# Patient Record
Sex: MALE | Employment: OTHER | URBAN - METROPOLITAN AREA
[De-identification: names, ages, dates, MRNs, and addresses within clinical notes are randomized per-mention and may not be internally consistent; named-entity substitution may affect disease eponyms.]

---

## 2023-04-21 ENCOUNTER — HOSPITAL ENCOUNTER (OUTPATIENT)
Facility: HOSPITAL | Age: 78
End: 2023-04-21
Attending: SURGERY | Admitting: SURGERY
Payer: MEDICARE

## 2024-03-13 RX ORDER — SIMVASTATIN 20 MG
20 TABLET ORAL AT BEDTIME
COMMUNITY
End: 2024-05-28

## 2024-03-13 RX ORDER — SERTRALINE HYDROCHLORIDE 25 MG/1
25 TABLET, FILM COATED ORAL DAILY
COMMUNITY
End: 2024-05-28

## 2024-03-13 RX ORDER — MEMANTINE HYDROCHLORIDE 5 MG/1
5 TABLET ORAL DAILY
COMMUNITY
End: 2024-05-28

## 2024-03-13 RX ORDER — CHLORAL HYDRATE 500 MG
2 CAPSULE ORAL DAILY
COMMUNITY
End: 2024-05-28

## 2024-03-13 RX ORDER — METOPROLOL TARTRATE 25 MG/1
12.5 TABLET, FILM COATED ORAL 2 TIMES DAILY
COMMUNITY
End: 2024-05-28

## 2024-03-13 RX ORDER — TAMSULOSIN HYDROCHLORIDE 0.4 MG/1
0.4 CAPSULE ORAL AT BEDTIME
COMMUNITY
End: 2024-05-28

## 2024-03-13 RX ORDER — UREA 10 %
500 LOTION (ML) TOPICAL DAILY
COMMUNITY
End: 2024-05-28

## 2024-03-14 ENCOUNTER — HOSPITAL ENCOUNTER (OUTPATIENT)
Facility: CLINIC | Age: 79
Discharge: HOME | End: 2024-03-14
Attending: OPHTHALMOLOGY | Admitting: OPHTHALMOLOGY
Payer: COMMERCIAL

## 2024-03-14 ENCOUNTER — ANESTHESIA (OUTPATIENT)
Dept: SURGERY | Facility: CLINIC | Age: 79
End: 2024-03-14
Payer: COMMERCIAL

## 2024-03-14 ENCOUNTER — ANESTHESIA EVENT (OUTPATIENT)
Dept: SURGERY | Facility: CLINIC | Age: 79
End: 2024-03-14
Payer: COMMERCIAL

## 2024-03-14 VITALS
DIASTOLIC BLOOD PRESSURE: 99 MMHG | WEIGHT: 183.6 LBS | BODY MASS INDEX: 24.33 KG/M2 | TEMPERATURE: 97 F | OXYGEN SATURATION: 97 % | HEART RATE: 115 BPM | SYSTOLIC BLOOD PRESSURE: 140 MMHG | RESPIRATION RATE: 16 BRPM | HEIGHT: 73 IN

## 2024-03-14 DIAGNOSIS — Z98.890 POSTOPERATIVE EYE STATE: Primary | ICD-10-CM

## 2024-03-14 PROBLEM — R97.20 ELEVATED PSA: Status: ACTIVE | Noted: 2024-03-14

## 2024-03-14 PROBLEM — D69.6 DISORDER INVOLVING THROMBOCYTOPENIA (H): Status: ACTIVE | Noted: 2024-03-14

## 2024-03-14 PROBLEM — G30.9 ALZHEIMER'S DISEASE (H): Status: ACTIVE | Noted: 2023-06-15

## 2024-03-14 PROBLEM — M72.0 DUPUYTREN CONTRACTURE: Status: ACTIVE | Noted: 2018-07-24

## 2024-03-14 PROBLEM — R35.0 INCREASED FREQUENCY OF URINATION: Status: ACTIVE | Noted: 2020-08-13

## 2024-03-14 PROBLEM — I48.20 CHRONIC ATRIAL FIBRILLATION (H): Status: ACTIVE | Noted: 2023-06-30

## 2024-03-14 PROBLEM — Z86.0100 HISTORY OF COLONIC POLYPS: Status: ACTIVE | Noted: 2018-07-24

## 2024-03-14 PROBLEM — E55.9 VITAMIN D DEFICIENCY: Status: ACTIVE | Noted: 2024-03-14

## 2024-03-14 PROBLEM — F02.80 ALZHEIMER'S DISEASE (H): Status: ACTIVE | Noted: 2023-06-15

## 2024-03-14 PROCEDURE — 710N000012 HC RECOVERY PHASE 2, PER MINUTE: Performed by: OPHTHALMOLOGY

## 2024-03-14 PROCEDURE — 250N000009 HC RX 250: Performed by: NURSE ANESTHETIST, CERTIFIED REGISTERED

## 2024-03-14 PROCEDURE — 370N000017 HC ANESTHESIA TECHNICAL FEE, PER MIN: Performed by: OPHTHALMOLOGY

## 2024-03-14 PROCEDURE — 88331 PATH CONSLTJ SURG 1 BLK 1SPC: CPT | Mod: TC | Performed by: OPHTHALMOLOGY

## 2024-03-14 PROCEDURE — 250N000011 HC RX IP 250 OP 636: Performed by: ANESTHESIOLOGY

## 2024-03-14 PROCEDURE — 258N000003 HC RX IP 258 OP 636: Performed by: NURSE ANESTHETIST, CERTIFIED REGISTERED

## 2024-03-14 PROCEDURE — 67840 REMOVE EYELID LESION: CPT | Performed by: ANESTHESIOLOGY

## 2024-03-14 PROCEDURE — 999N000141 HC STATISTIC PRE-PROCEDURE NURSING ASSESSMENT: Performed by: OPHTHALMOLOGY

## 2024-03-14 PROCEDURE — 710N000009 HC RECOVERY PHASE 1, LEVEL 1, PER MIN: Performed by: OPHTHALMOLOGY

## 2024-03-14 PROCEDURE — 250N000011 HC RX IP 250 OP 636: Performed by: NURSE ANESTHETIST, CERTIFIED REGISTERED

## 2024-03-14 PROCEDURE — 360N000076 HC SURGERY LEVEL 3, PER MIN: Performed by: OPHTHALMOLOGY

## 2024-03-14 PROCEDURE — 250N000009 HC RX 250: Performed by: OPHTHALMOLOGY

## 2024-03-14 PROCEDURE — 67840 REMOVE EYELID LESION: CPT | Performed by: NURSE ANESTHETIST, CERTIFIED REGISTERED

## 2024-03-14 PROCEDURE — 99100 ANES PT EXTEME AGE<1 YR&>70: CPT | Performed by: NURSE ANESTHETIST, CERTIFIED REGISTERED

## 2024-03-14 PROCEDURE — 272N000001 HC OR GENERAL SUPPLY STERILE: Performed by: OPHTHALMOLOGY

## 2024-03-14 PROCEDURE — 250N000013 HC RX MED GY IP 250 OP 250 PS 637: Performed by: OPHTHALMOLOGY

## 2024-03-14 RX ORDER — ERYTHROMYCIN 5 MG/G
OINTMENT OPHTHALMIC
Status: DISCONTINUED
Start: 2024-03-14 | End: 2024-03-14 | Stop reason: HOSPADM

## 2024-03-14 RX ORDER — SODIUM CHLORIDE, SODIUM LACTATE, POTASSIUM CHLORIDE, CALCIUM CHLORIDE 600; 310; 30; 20 MG/100ML; MG/100ML; MG/100ML; MG/100ML
INJECTION, SOLUTION INTRAVENOUS CONTINUOUS PRN
Status: DISCONTINUED | OUTPATIENT
Start: 2024-03-14 | End: 2024-03-14

## 2024-03-14 RX ORDER — ONDANSETRON 2 MG/ML
INJECTION INTRAMUSCULAR; INTRAVENOUS PRN
Status: DISCONTINUED | OUTPATIENT
Start: 2024-03-14 | End: 2024-03-14

## 2024-03-14 RX ORDER — OXYCODONE HYDROCHLORIDE 5 MG/1
5 TABLET ORAL EVERY 6 HOURS PRN
Qty: 12 TABLET | Refills: 0 | Status: SHIPPED | OUTPATIENT
Start: 2024-03-14 | End: 2024-03-17

## 2024-03-14 RX ORDER — FENTANYL CITRATE 0.05 MG/ML
50 INJECTION, SOLUTION INTRAMUSCULAR; INTRAVENOUS EVERY 5 MIN PRN
Status: DISCONTINUED | OUTPATIENT
Start: 2024-03-14 | End: 2024-03-14 | Stop reason: HOSPADM

## 2024-03-14 RX ORDER — ONDANSETRON 2 MG/ML
4 INJECTION INTRAMUSCULAR; INTRAVENOUS EVERY 30 MIN PRN
Status: DISCONTINUED | OUTPATIENT
Start: 2024-03-14 | End: 2024-03-14 | Stop reason: HOSPADM

## 2024-03-14 RX ORDER — PROPOFOL 10 MG/ML
INJECTION, EMULSION INTRAVENOUS PRN
Status: DISCONTINUED | OUTPATIENT
Start: 2024-03-14 | End: 2024-03-14

## 2024-03-14 RX ORDER — HYDRALAZINE HYDROCHLORIDE 20 MG/ML
2.5-5 INJECTION INTRAMUSCULAR; INTRAVENOUS EVERY 10 MIN PRN
Status: DISCONTINUED | OUTPATIENT
Start: 2024-03-14 | End: 2024-03-14 | Stop reason: HOSPADM

## 2024-03-14 RX ORDER — ERYTHROMYCIN 5 MG/G
OINTMENT OPHTHALMIC PRN
Status: DISCONTINUED | OUTPATIENT
Start: 2024-03-14 | End: 2024-03-14 | Stop reason: HOSPADM

## 2024-03-14 RX ORDER — OXYCODONE HYDROCHLORIDE 5 MG/1
5 TABLET ORAL ONCE
Status: COMPLETED | OUTPATIENT
Start: 2024-03-14 | End: 2024-03-14

## 2024-03-14 RX ORDER — NALOXONE HYDROCHLORIDE 0.4 MG/ML
0.1 INJECTION, SOLUTION INTRAMUSCULAR; INTRAVENOUS; SUBCUTANEOUS
Status: DISCONTINUED | OUTPATIENT
Start: 2024-03-14 | End: 2024-03-14 | Stop reason: HOSPADM

## 2024-03-14 RX ORDER — LIDOCAINE HYDROCHLORIDE AND EPINEPHRINE 10; 10 MG/ML; UG/ML
INJECTION, SOLUTION INFILTRATION; PERINEURAL
Status: DISCONTINUED
Start: 2024-03-14 | End: 2024-03-14 | Stop reason: HOSPADM

## 2024-03-14 RX ORDER — FENTANYL CITRATE 50 UG/ML
INJECTION, SOLUTION INTRAMUSCULAR; INTRAVENOUS PRN
Status: DISCONTINUED | OUTPATIENT
Start: 2024-03-14 | End: 2024-03-14

## 2024-03-14 RX ORDER — LIDOCAINE HYDROCHLORIDE AND EPINEPHRINE 10; 10 MG/ML; UG/ML
INJECTION, SOLUTION INFILTRATION; PERINEURAL PRN
Status: DISCONTINUED | OUTPATIENT
Start: 2024-03-14 | End: 2024-03-14 | Stop reason: HOSPADM

## 2024-03-14 RX ORDER — ONDANSETRON 4 MG/1
4 TABLET, ORALLY DISINTEGRATING ORAL EVERY 30 MIN PRN
Status: DISCONTINUED | OUTPATIENT
Start: 2024-03-14 | End: 2024-03-14 | Stop reason: HOSPADM

## 2024-03-14 RX ORDER — LIDOCAINE HYDROCHLORIDE 20 MG/ML
INJECTION, SOLUTION INFILTRATION; PERINEURAL PRN
Status: DISCONTINUED | OUTPATIENT
Start: 2024-03-14 | End: 2024-03-14

## 2024-03-14 RX ORDER — ERYTHROMYCIN 5 MG/G
OINTMENT OPHTHALMIC
Qty: 3.5 G | Refills: 1 | Status: SHIPPED | OUTPATIENT
Start: 2024-03-14 | End: 2024-05-28

## 2024-03-14 RX ORDER — FENTANYL CITRATE 0.05 MG/ML
25 INJECTION, SOLUTION INTRAMUSCULAR; INTRAVENOUS EVERY 5 MIN PRN
Status: DISCONTINUED | OUTPATIENT
Start: 2024-03-14 | End: 2024-03-14 | Stop reason: HOSPADM

## 2024-03-14 RX ORDER — DEXAMETHASONE SODIUM PHOSPHATE 4 MG/ML
INJECTION, SOLUTION INTRA-ARTICULAR; INTRALESIONAL; INTRAMUSCULAR; INTRAVENOUS; SOFT TISSUE PRN
Status: DISCONTINUED | OUTPATIENT
Start: 2024-03-14 | End: 2024-03-14

## 2024-03-14 RX ORDER — NEOMYCIN POLYMYXIN B SULFATES AND DEXAMETHASONE 3.5; 10000; 1 MG/ML; [USP'U]/ML; MG/ML
SUSPENSION/ DROPS OPHTHALMIC
Qty: 5 ML | Refills: 0 | Status: SHIPPED | OUTPATIENT
Start: 2024-03-14 | End: 2024-05-28

## 2024-03-14 RX ORDER — SODIUM CHLORIDE, SODIUM LACTATE, POTASSIUM CHLORIDE, CALCIUM CHLORIDE 600; 310; 30; 20 MG/100ML; MG/100ML; MG/100ML; MG/100ML
INJECTION, SOLUTION INTRAVENOUS CONTINUOUS
Status: DISCONTINUED | OUTPATIENT
Start: 2024-03-14 | End: 2024-03-14 | Stop reason: HOSPADM

## 2024-03-14 RX ORDER — TETRACAINE HYDROCHLORIDE 5 MG/ML
SOLUTION OPHTHALMIC PRN
Status: DISCONTINUED | OUTPATIENT
Start: 2024-03-14 | End: 2024-03-14 | Stop reason: HOSPADM

## 2024-03-14 RX ADMIN — SODIUM CHLORIDE, POTASSIUM CHLORIDE, SODIUM LACTATE AND CALCIUM CHLORIDE: 600; 310; 30; 20 INJECTION, SOLUTION INTRAVENOUS at 11:39

## 2024-03-14 RX ADMIN — FENTANYL CITRATE 50 MCG: 50 INJECTION, SOLUTION INTRAMUSCULAR; INTRAVENOUS at 13:03

## 2024-03-14 RX ADMIN — DEXAMETHASONE SODIUM PHOSPHATE 4 MG: 4 INJECTION, SOLUTION INTRA-ARTICULAR; INTRALESIONAL; INTRAMUSCULAR; INTRAVENOUS; SOFT TISSUE at 11:45

## 2024-03-14 RX ADMIN — FENTANYL CITRATE 50 MCG: 50 INJECTION, SOLUTION INTRAMUSCULAR; INTRAVENOUS at 12:51

## 2024-03-14 RX ADMIN — FENTANYL CITRATE 100 MCG: 50 INJECTION INTRAMUSCULAR; INTRAVENOUS at 11:39

## 2024-03-14 RX ADMIN — OXYCODONE HYDROCHLORIDE 5 MG: 5 TABLET ORAL at 13:17

## 2024-03-14 RX ADMIN — ONDANSETRON 4 MG: 2 INJECTION INTRAMUSCULAR; INTRAVENOUS at 11:45

## 2024-03-14 RX ADMIN — LIDOCAINE HYDROCHLORIDE 40 MG: 20 INJECTION, SOLUTION INFILTRATION; PERINEURAL at 11:43

## 2024-03-14 RX ADMIN — PROPOFOL 40 MG: 10 INJECTION, EMULSION INTRAVENOUS at 11:43

## 2024-03-14 ASSESSMENT — ACTIVITIES OF DAILY LIVING (ADL)
ADLS_ACUITY_SCORE: 35

## 2024-03-14 ASSESSMENT — ENCOUNTER SYMPTOMS: DYSRHYTHMIAS: 1

## 2024-03-14 NOTE — ANESTHESIA POSTPROCEDURE EVALUATION
Patient: Young Tran    Procedure: Procedure(s):  Left Lower Lid Wedge Excision of Lesion with Frozen Section Control and Reconstruction       Anesthesia Type:  MAC    Note:     Postop Pain Control: Uneventful            Sign Out: Well controlled pain   PONV: No   Neuro/Psych: Uneventful            Sign Out: Acceptable/Baseline neuro status   Airway/Respiratory: Uneventful            Sign Out: Acceptable/Baseline resp. status   CV/Hemodynamics: Uneventful            Sign Out: Acceptable CV status; No obvious hypovolemia; No obvious fluid overload   Other NRE: NONE   DID A NON-ROUTINE EVENT OCCUR? No           Last vitals:  Vitals Value Taken Time   /107 03/14/24 1314   Temp     Pulse 117 03/14/24 1315   Resp 11 03/14/24 1315   SpO2 96 % 03/14/24 1315   Vitals shown include unfiled device data.    Electronically Signed By: Crystal Hansen MD  March 14, 2024  1:27 PM

## 2024-03-14 NOTE — BRIEF OP NOTE
[facility]    Brief Operative Note    Pre-operative diagnosis: Basal cell carcinoma of lower eyelid, left [C44.1192]  Post-operative diagnosis Same as pre-operative diagnosis    Procedure: Left Lower Lid Wedge Excision of Lesion with Frozen Section Control and Reconstruction, Bilateral - Eye    Surgeon: Surgeon(s) and Role:     * Suhas Flores MD - Primary  Anesthesia: MAC   Estimated Blood Loss: Minimal    Drains: None  Specimens:   ID Type Source Tests Collected by Time Destination   1 : LEFT LOWER EYELID LESION Tissue Eyelid, Lower, Left SURGICAL PATHOLOGY EXAM Suhas Flores MD 3/14/2024 11:50 AM    2 : LEFT LOWER EYELID LESION MEDIAL MARGIN Tissue Eyelid, Lower, Left SURGICAL PATHOLOGY EXAM Suhas Flores MD 3/14/2024 11:52 AM    3 : LEFT LOWER EYELID LESION LATERAL MARGIN Tissue Eyelid, Lower, Left SURGICAL PATHOLOGY EXAM Suhas Flores MD 3/14/2024 11:52 AM    4 : LEFT LOWER EYELID LESION DEEP MARGIN Tissue Eyelid, Lower, Left SURGICAL PATHOLOGY EXAM Suhas Flores MD 3/14/2024 11:54 AM      Findings:   None.  Complications: None.  Implants: * No implants in log *

## 2024-03-14 NOTE — DISCHARGE INSTRUCTIONS
Post-operative Instructions  Ophthalmic Plastic and Reconstructive Surgery  Suhas Flores M.D.      All instructions apply to the operated eye(s) or eyelid(s).    Wound care and personal care  ? Apply ice compresses for 20 minutes every hour while awake for 2 days, then switch to warm water compresses 4 times a day until seen by your physician.For warm packs you can place a cup of dry uncooked rice in a clean cotton sock. Then place sock in microwave 30 seconds to one minute. Next place the warm sock into a plastic bag and wrap the bag with clean warm wet washcloth and place over operated eye.    ? You may shower or wash your hair the day after surgery. Do not bathe or go swimming for 1 week to prevent contamination of your wounds.  ? Do not apply make-up to the eyes or eyelids for 2 weeks after surgery.  ? Expect some swelling, bruising, black eye (even into the lower eyelids and cheeks). Also expect serum caking, crusting and discharge from the eye and/or incisions. A small amount of surface bleeding is normal for the first 48 hours.  ? Your eye(s) and eyelid(s) may be painful and tender. This is normal after surgery. Use the pain medication as prescribed. If your pain does not improve despite the medication, contact the office.      Contact information and follow-up  ? Return to the Eye Clinic for a follow-up appointment with your physician as scheduled. If no appointment has been scheduled, call [phone] for an appointment with Dr. Flores within 1 to 2 weeks from your date of surgery.      Activity restrictions and driving  ? Avoid heavy lifting, bending, exercise or strenuous activity for 1 week after surgery. You may resume other activities and return to work as tolerated.  ? You may not resume driving until have you stopped using narcotic pain medication (such as Norco, Percocet, Tylenol #3).    Medications  ? Restart all your regular home medications and eye drops. If you take Plavix or Aspirin  on a regular basis, wait for 5 days after your surgery before restarting these in order to decrease the risk of bleeding complications.  ? Avoid aspirin and aspirin-like medications (Motrin, Aleve, Ibuprofen, Kelsea-Kennedy etc) for 5 days to reduce the risk of bleeding. You may take Tylenol (acetaminophen) for pain.  ? In addition to your home medications, take the following post-operative medications as prescribed by your physician.     ? Apply antibiotic ointment to all sutures three a day, and into the operated eye(s) at night.   ? Instill eye drops four times a day until the bottle is finished.   ? Take 1 to 2 pain pills as needed for pain.     ? WARNING: All the prescription pain medications listed above contain Tylenol (acetaminophen). You must not take more than 4,000 mg of acetaminophen per 24-hour period. This is equivalent to 12 tablets of Norco, Percocet or Tylenol #3. If you take other over-the-counter medications containing acetaminophen, you must take the amount of acetaminophen into account and reduce the number of prescribed pain pills accordingly.  ? The prescribed medications may make you drowsy. You must not drive a car, operate heavy machinery or drink alcohol while taking them.  ? The prescribed pain medications may cause constipation and nausea. Take them with some food to prevent a stomach upset. If you continue to experience nausea, call your physician.    For severe pain, bleeding, or loss of vision, call the office at [phone].  Same Day Surgery Discharge Instructions for  Sedation and General Anesthesia     It's not unusual to feel dizzy, light-headed or faint for up to 24 hours after surgery or while taking pain medication.  If you have these symptoms: sit for a few minutes before standing and have someone assist you when you get up to walk or use the bathroom.    You should rest and relax for the next 24 hours. We recommend you make arrangements to have an adult stay with you for at  least 24 hours after your discharge.  Avoid hazardous and strenuous activity.    DO NOT DRIVE any vehicle or operate mechanical equipment for 24 hours following the end of your surgery.  Even though you may feel normal, your reactions may be affected by the medication you have received.    Do not drink alcoholic beverages for 24 hours following surgery.     Slowly progress to your regular diet as you feel able. It's not unusual to feel nauseated and/or vomit after receiving anesthesia.  If you develop these symptoms, drink clear liquids (apple juice, ginger ale, broth, 7-up, etc. ) until you feel better.  If your nausea and vomiting persists for 24 hours, please notify your surgeon.      All narcotic pain medications, along with inactivity and anesthesia, can cause constipation. Drinking plenty of liquids and increasing fiber intake will help.    For any questions of a medical nature, call your surgeon.    Do not make important decisions for 24 hours.    If you had general anesthesia, you may have a sore throat for a couple of days related to the breathing tube used during surgery.  You may use Cepacol lozenges to help with this discomfort.  If it worsens or if you develop a fever, contact your surgeon.     If you feel your pain is not well managed with the pain medications prescribed by your surgeon, please contact your surgeon's office to let them know so they can address your concerns.            **If you have questions or concerns about your procedure,   call Dr Flores at [phone](if you see him in [place]) or  [phone] (if you see him at the [facility])**

## 2024-03-14 NOTE — ANESTHESIA PREPROCEDURE EVALUATION
Anesthesia Pre-Procedure Evaluation    Patient: Young Tran   MRN: [id number] : 1945        Procedure : Procedure(s):  Left Lower Lid Wedge Excision of Lesion with Frozen Section Control          Past Medical History:   Diagnosis Date    Alzheimer's disease (H)     Chronic a-fib (H)     Dupuytren contracture     Elevated prostate specific antigen (PSA)     History of colonic polyps     HLD (hyperlipidemia)     Prostate cancer (H)     Tear of lateral meniscus of left knee     Vitamin B12 deficiency (non anemic)     Vitamin D deficiency       Past Surgical History:   Procedure Laterality Date    BIOPSY      prostate x4    COLONOSCOPY  2012    COLONOSCOPY  2009    COLONOSCOPY  2007    HERNIA REPAIR Right 1998    POLYPECTOMY        No Known Allergies   Social History     Tobacco Use    Smoking status: Never    Smokeless tobacco: Never   Substance Use Topics    Alcohol use: Yes     Comment: 1-2 drinks per week      Wt Readings from Last 1 Encounters:   24 83.3 kg (183 lb 9.6 oz)        Anesthesia Evaluation   Pt has had prior anesthetic.         ROS/MED HX  ENT/Pulmonary:    (-) sleep apnea   Neurologic: Comment: Cognitive decline, memory loss, Alzeimer's ds      Cardiovascular:     (+) Dyslipidemia - -   -  - -                        dysrhythmias, a-fib,             METS/Exercise Tolerance:     Hematologic:       Musculoskeletal:       GI/Hepatic:    (-) GERD   Renal/Genitourinary:  - neg Renal ROS     Endo:  - neg endo ROS  (-) Type II DM   Psychiatric/Substance Use:     (+) psychiatric history depression       Infectious Disease:       Malignancy:   (+) Malignancy, History of Prostate.    Other:            Physical Exam    Airway        Mallampati: II   TM distance: > 3 FB   Neck ROM: full   Mouth opening: > 3 cm    Respiratory Devices and Support         Dental       (+) Minor Abnormalities - some fillings, tiny chips      Cardiovascular   cardiovascular exam normal          Pulmonary   pulmonary  "exam normal                OUTSIDE LABS:  CBC: No results found for: \"WBC\", \"HGB\", \"HCT\", \"PLT\"  BMP: No results found for: \"NA\", \"POTASSIUM\", \"CHLORIDE\", \"CO2\", \"BUN\", \"CR\", \"GLC\"  COAGS: No results found for: \"PTT\", \"INR\", \"FIBR\"  POC: No results found for: \"BGM\", \"HCG\", \"HCGS\"  HEPATIC: No results found for: \"ALBUMIN\", \"PROTTOTAL\", \"ALT\", \"AST\", \"GGT\", \"ALKPHOS\", \"BILITOTAL\", \"BILIDIRECT\", \"ANGELICA\"  OTHER: No results found for: \"PH\", \"LACT\", \"A1C\", \"SUNIL\", \"PHOS\", \"MAG\", \"LIPASE\", \"AMYLASE\", \"TSH\", \"T4\", \"T3\", \"CRP\", \"SED\"    Anesthesia Plan    ASA Status:  3    NPO Status:  NPO Appropriate    Anesthesia Type: MAC.     - Reason for MAC: immobility needed, straight local not clinically adequate              Consents    Anesthesia Plan(s) and associated risks, benefits, and realistic alternatives discussed. Questions answered and patient/representative(s) expressed understanding.     - Discussed:     - Discussed with:  Patient, Other (See Comment)            Postoperative Care    Pain management: IV analgesics.   PONV prophylaxis: Ondansetron (or other 5HT-3)     Comments:               Crystal Hansen MD    I have reviewed the pertinent notes and labs in the chart from the past 30 days and (re)examined the patient.  Any updates or changes from those notes are reflected in this note.            # Drug Induced Coagulation Defect: home medication list includes an anticoagulant medication       "

## 2024-03-14 NOTE — ANESTHESIA CARE TRANSFER NOTE
Patient: Young Tran    Procedure: Procedure(s):  Left Lower Lid Wedge Excision of Lesion with Frozen Section Control and Reconstruction       Diagnosis: Basal cell carcinoma of lower eyelid, left [C44.1192]  Diagnosis Additional Information: No value filed.    Anesthesia Type:   MAC     Note:    Oropharynx: oropharynx clear of all foreign objects  Level of Consciousness: awake  Oxygen Supplementation: room air    Independent Airway: airway patency satisfactory and stable  Dentition: dentition unchanged  Vital Signs Stable: post-procedure vital signs reviewed and stable  Report to RN Given: handoff report given  Patient transferred to: PACU    Handoff Report: Identifed the Patient, Identified the Reponsible Provider, Reviewed the pertinent medical history, Discussed the surgical course, Reviewed Intra-OP anesthesia mangement and issues during anesthesia, Set expectations for post-procedure period and Allowed opportunity for questions and acknowledgement of understanding        Electronically Signed By: DASH Mercado CRNA  March 14, 2024  12:25 PM

## 2024-03-14 NOTE — OP NOTE
PREOPERATIVE DIAGNOSIS: Left lower eyelid basal cell carcinoma.    POSTOPERATIVE DIAGNOSIS: Left lower eyelid basal cell carcinoma.   PROCEDURE: Left lower eyelid wedge resection of malignant neoplasm with frozen section guidance (1.5cm) and reconstruction of margin and lateral Tenzel flap and lateral canthopexy.   ANESTHESIA: Monitored with local infiltration of a 50/50 mixture of 2% lidocaine with epinephrine.  SURGEON: Suhas Flores MD  ASSISTANT: Gilda Guzmán MD, DIANA and Shiva Urias MD  COMPLICATIONS: None.   ESTIMATED BLOOD LOSS: Less than 5 mL.   SPECIMENS:   1. Left lower eyelid lesion in formalin   2. Left lower eyelid, medial, inferior and lateral margins for frozen section analysis.   HISTORY AND INDICATIONS: The patient presented with a left lower lid lesion that was slowly enlarging. Clinically, this appeared to be consistent with a basal cell or squamous cell.  After the risks, benefits and alternatives to the proposed procedure were explained, informed consent was obtained.   DESCRIPTION OF PROCEDURE: The patient was brought to the operating room and placed supine on the operating table. IV sedation was given. The  left lower lid and  lateral canthal areas were infiltrated with local anesthetic. He was prepped and draped in the typical sterile ophthalmic fashion. Attention was directed to the left lower lid. The area of the lesion was excised with a Farzana scissors. Hemostasis was obtained with high temperature cautery. Medial, lateral and inferior margins were sent for frozen section analysis. These were all negative. A lateral canthal flap was drawn and incised with a 15 blade and undermined with Farzana scissors and high temperature cautery. The flap was rotated in the defect and sutured in place with a vertical mattress 6-0 Vicryl suture tied internally at the lid margin and the 6-0 Vicryl suture at the lash line. Interrupted 5-0 Vicryl sutures were used to close the posterior  lamella. Skin was closed with interrupted 6-0 Vicryl and 6-0 plain gut sutures. Lateral canthopexy suture of 5-0 Vicryl was used to secure the flap to lateral orbital rim periosteum and a 6-0 Vicryl suture used to secure the canthal angle to the upper eyelid. The skin was closed with running 6-0 plain gut suture. Erythromycin ophthalmic ointment was applied. The patient tolerated the procedure well and left the operating room in stable condition.   NORIS REDD MD

## 2024-03-15 LAB
PATH REPORT.COMMENTS IMP SPEC: ABNORMAL
PATH REPORT.COMMENTS IMP SPEC: ABNORMAL
PATH REPORT.COMMENTS IMP SPEC: YES
PATH REPORT.FINAL DX SPEC: ABNORMAL
PATH REPORT.GROSS SPEC: ABNORMAL
PATH REPORT.INTRAOP OBS SPEC DOC: ABNORMAL
PATH REPORT.MICROSCOPIC SPEC OTHER STN: ABNORMAL
PATH REPORT.RELEVANT HX SPEC: ABNORMAL
PHOTO IMAGE: ABNORMAL

## 2024-03-15 PROCEDURE — 88305 TISSUE EXAM BY PATHOLOGIST: CPT | Mod: 26 | Performed by: PATHOLOGY

## 2024-03-15 PROCEDURE — 88331 PATH CONSLTJ SURG 1 BLK 1SPC: CPT | Mod: 26 | Performed by: PATHOLOGY

## 2024-05-23 ENCOUNTER — DOCUMENTATION ONLY (OUTPATIENT)
Dept: GERIATRICS | Facility: CLINIC | Age: 79
End: 2024-05-23
Payer: COMMERCIAL

## 2024-05-23 ENCOUNTER — DOCUMENTATION ONLY (OUTPATIENT)
Dept: OTHER | Facility: CLINIC | Age: 79
End: 2024-05-23
Payer: COMMERCIAL

## 2024-05-28 ENCOUNTER — ASSISTED LIVING VISIT (OUTPATIENT)
Dept: GERIATRICS | Facility: CLINIC | Age: 79
End: 2024-05-28
Payer: COMMERCIAL

## 2024-05-28 DIAGNOSIS — G30.9 ALZHEIMER'S DISEASE (H): Primary | ICD-10-CM

## 2024-05-28 DIAGNOSIS — F32.A DEPRESSION, UNSPECIFIED DEPRESSION TYPE: ICD-10-CM

## 2024-05-28 DIAGNOSIS — I48.20 CHRONIC ATRIAL FIBRILLATION (H): ICD-10-CM

## 2024-05-28 DIAGNOSIS — Z71.89 ADVANCED DIRECTIVES, COUNSELING/DISCUSSION: ICD-10-CM

## 2024-05-28 DIAGNOSIS — E78.5 HYPERLIPIDEMIA, UNSPECIFIED HYPERLIPIDEMIA TYPE: ICD-10-CM

## 2024-05-28 DIAGNOSIS — E53.8 B12 DEFICIENCY: ICD-10-CM

## 2024-05-28 DIAGNOSIS — C61 PROSTATE CANCER (H): ICD-10-CM

## 2024-05-28 DIAGNOSIS — F02.80 ALZHEIMER'S DISEASE (H): Primary | ICD-10-CM

## 2024-05-28 PROBLEM — K57.90 DIVERTICULOSIS: Status: ACTIVE | Noted: 2024-05-28

## 2024-05-28 PROBLEM — K40.30 INGUINAL HERNIA WITH OBSTRUCTION: Status: ACTIVE | Noted: 2023-04-18

## 2024-05-28 PROCEDURE — 99344 HOME/RES VST NEW MOD MDM 60: CPT | Performed by: NURSE PRACTITIONER

## 2024-05-28 RX ORDER — SERTRALINE HYDROCHLORIDE 25 MG/1
25 TABLET, FILM COATED ORAL DAILY
Qty: 30 TABLET | Refills: 11 | Status: SHIPPED | OUTPATIENT
Start: 2024-05-28 | End: 2024-06-07 | Stop reason: DRUGHIGH

## 2024-05-28 RX ORDER — LANOLIN ALCOHOL/MO/W.PET/CERES
1000 CREAM (GRAM) TOPICAL DAILY
Qty: 30 TABLET | Refills: 11 | Status: SHIPPED | OUTPATIENT
Start: 2024-05-28

## 2024-05-28 RX ORDER — SIMVASTATIN 20 MG
20 TABLET ORAL AT BEDTIME
Qty: 30 TABLET | Refills: 11 | Status: SHIPPED | OUTPATIENT
Start: 2024-05-28

## 2024-05-28 RX ORDER — LANOLIN ALCOHOL/MO/W.PET/CERES
1000 CREAM (GRAM) TOPICAL DAILY
COMMUNITY
End: 2024-05-28

## 2024-05-28 RX ORDER — METOPROLOL TARTRATE 25 MG/1
12.5 TABLET, FILM COATED ORAL 2 TIMES DAILY
Qty: 30 TABLET | Refills: 11 | Status: SHIPPED | OUTPATIENT
Start: 2024-05-28 | End: 2024-10-04

## 2024-05-28 RX ORDER — TAMSULOSIN HYDROCHLORIDE 0.4 MG/1
0.4 CAPSULE ORAL AT BEDTIME
Qty: 30 CAPSULE | Refills: 11 | Status: SHIPPED | OUTPATIENT
Start: 2024-05-28

## 2024-05-28 NOTE — LETTER
2024        RE: Young Tran  [address]  [place] [address]        [facility] GERIATRICS  Primary Care Provider & Clinic: [facility], [address] [address]  Chief Complaint   Patient presents with     New Patient     Establish Care     [facility] Medical Record Number: [id number]  Place of Service Where Encounter Took Place: THE [facility]) [[id number]]    Young Tran is a 78 year old (1945), living in above facility since 3/2024 and now choosing to change PCPs to [facility].     HPI:    He and his SO were in independent living at this facility since 3/2024. His SO  last week and he is moving to Memory Care.   Medical history significant for Alzheimer's disease, chronic afib,status post cardioversion 2023, hyperlipidemia, BPH, prostate cancer treated with radiation 2019, vitamin B12 deficiency.  He had left eyelid surgery for a basal cell carcinoma 3/14/2024.   He's followed by [facility].   Neuropsych testing in  showed prominent amnestic impairment with semantic fluency loss and mild executive impairment. He was evaluated by Behavioral Neurology at [facility] 2023 for cognitive deficits for 4-5 yrs and diagnosed with Alzheimer's disease. MRI brain 6/15/2023 showed moderate fairly symmetric generalized atrophy, mild to moderate cerebrovascular. disease. He's been on donepezil and memantine, both discontinued.     He is a poor historian. Pleasant and talkative, nonsensical at times. Denies feeling ill. Denies pain. Ambulates without a device. Independent with toileting, requires supervision to min assist with bathing and dressing. Meds are now administered by staff.   His daughter Peg Hernandez is here from [place] and a family friend is also present. Peg reports he had a dizzy spell last week, she's not aware of any recurrent episodes.     CODE STATUS/ADVANCE DIRECTIVES DISCUSSION:  No CPR- Pre-arrest intubation OK - DNR, intubation for short term acute illness only   Patient's living condition: lives in an assisted living facility  ALLERGIES: No Known Allergies   PAST MEDICAL HISTORY:   Past Medical History:   Diagnosis Date     Alzheimer's disease (H)      Chronic a-fib (H)      Dupuytren contracture      Elevated prostate specific antigen (PSA)      History of colonic polyps      HLD (hyperlipidemia)      Prostate cancer (H)      Tear of lateral meniscus of left knee      Vitamin B12 deficiency (non anemic)      Vitamin D deficiency       PAST SURGICAL HISTORY:  has a past surgical history that includes hernia repair (Right, ); colonoscopy (); Colonoscopy (); colonoscopy (); Polypectomy; biopsy; and Wedge resection eyelid (Bilateral, 3/14/2024).  FAMILY HISTORY: family history includes Prostate Cancer in his brother and father.  SOCIAL HISTORY:  reports that he has never smoked. He has never used smokeless tobacco. He reports that he does not currently use alcohol. He reports that he does not use drugs.  SO  last week. One daughter Peg, who lives near [place]. 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 He's a member of the [employer].         Current Outpatient Medications   Medication Sig Dispense Refill     apixaban ANTICOAGULANT (ELIQUIS) 5 MG tablet Take 1 tablet (5 mg) by mouth 2 times daily 60 tablet 11     cyanocobalamin (VITAMIN B-12) 1000 MCG tablet Take 1 tablet (1,000 mcg) by mouth daily 30 tablet 11     metoprolol tartrate (LOPRESSOR) 25 MG tablet Take 0.5 tablets (12.5 mg) by mouth 2 times daily 30 tablet 11     sertraline (ZOLOFT) 25 MG tablet Take 1 tablet (25 mg) by mouth daily 30 tablet 11     simvastatin (ZOCOR) 20 MG tablet Take 1 tablet (20 mg) by mouth at bedtime 30 tablet 11     tamsulosin (FLOMAX) 0.4 MG capsule Take 1 capsule (0.4 mg) by mouth at bedtime 30 capsule 11     No current  facility-administered medications for this visit.     ROS:  Limited due to dementia. Positives as noted above    Vitals:  /62   Pulse 87   Resp 18   Wt 79.4 kg (175 lb)   SpO2 98%   BMI 23.09 kg/m    Exam:  GENERAL APPEARANCE:  Alert, in no distress  ENT:  [place]  EYES:  conjunctiva and lids normal  NECK:  no adenopathy, no thyromegaly  RESP:  lungs clear to auscultation   CV:  irregular rate and rhythm, no murmur, no LE edema   ABDOMEN:  soft, non-tender, no distension  M/S:   gait steady. BEAN with good strength. No joint inflammation   SKIN:  no rashes or open areas  PSYCH:  oriented to self, daughter, friend, situation, insight and judgement impaired, memory impaired , affect and mood normal, slightly anxious     Lab/Diagnostic Data:  Recent labs in [facility] reviewed by me today.     ASSESSMENT / PLAN:  (G30.9,  F02.80) Alzheimer's disease (H)  (primary encounter diagnosis)  Comment: progressive disease, now requiring a higher level of care  Plan: move to Memory Care this week as planned  Discussed with staff     (F32.A) Depression, unspecified depression type  Comment: slightly anxious, mood appears good   Plan: continue sertraline 25 mg daily, adjust dose as indicated     (I48.20) Chronic atrial fibrillation (H)  Comment: status post cardioversion 8/2023, rhythm irregular. HR in the  today, though he's somewhat anxious   Plan: continue apixaban 5 mg bid, metoprolol tartrate 12.5 mg bid. Follow up with Cardiology as scheduled     (C61) Prostate cancer (H)  BPH   Comment: treated with radiation 12/2019. Followed by [facility], last seen 4/2014.   Plan: continue tamsulosin. Urology follow up per usual schedule     (E78.5) Hyperlipidemia, unspecified hyperlipidemia type  Comment: lipid panel 11/3/2023 within range   Plan: continue simvastatin 20 mg daily     (E53.8) B12 deficiency  Comment: B12 level normal at 881 on 11/3/2023  Plan: continue vitamin B12 1000 mcg daily     (Z71.89) Advanced directives,  counseling/discussion  Comment: his healthcare directive was reviewed with his daughter, who confirms DNR, intubation for short term acute illness only   Plan: POLST completed           Electronically signed by: DASH Isabel CNP      Sincerely,        DASH Isabel CNP

## 2024-05-28 NOTE — PROGRESS NOTES
[facility] GERIATRICS  Primary Care Provider & Clinic: Juma Love, DASH CNP, [address] [facility] [address]  Chief Complaint   Patient presents with    New Patient    Establish Care     [place] Medical Record Number: [id number]  Place of Service Where Encounter Took Place: THE [facility]) [[id number]]    Young Tran is a 78 year old (1945), living in above facility since 3/2024 and now choosing to change PCPs to [facility].     HPI:    He and his SO were in independent living at this facility since 3/2024. His SO  last week and he is moving to Memory Care.   Medical history significant for Alzheimer's disease, chronic afib,status post cardioversion 2023, hyperlipidemia, BPH, prostate cancer treated with radiation 2019, vitamin B12 deficiency.  He had left eyelid surgery for a basal cell carcinoma 3/14/2024.   He's followed by [facility] [place] [facility].   Neuropsych testing in  showed prominent amnestic impairment with semantic fluency loss and mild executive impairment. He was evaluated by Behavioral Neurology at [facility] 2023 for cognitive deficits for 4-5 yrs and diagnosed with Alzheimer's disease. MRI brain 6/15/2023 showed moderate fairly symmetric generalized atrophy, mild to moderate cerebrovascular. disease. He's been on donepezil and memantine, both discontinued.     He is a poor historian. Pleasant and talkative, nonsensical at times. Denies feeling ill. Denies pain. Ambulates without a device. Independent with toileting, requires supervision to min assist with bathing and dressing. Meds are now administered by staff.   His daughter Peg Hernandez is here from [place] and a family friend is also present. Peg reports he had a dizzy spell last week, she's not aware of any recurrent episodes.     CODE STATUS/ADVANCE DIRECTIVES DISCUSSION: No CPR- Pre-arrest intubation OK - DNR, intubation for short term acute illness only   Patient's living  condition: lives in an assisted living facility  ALLERGIES: No Known Allergies   PAST MEDICAL HISTORY:   Past Medical History:   Diagnosis Date    Alzheimer's disease (H)     Chronic a-fib (H)     Dupuytren contracture     Elevated prostate specific antigen (PSA)     History of colonic polyps     HLD (hyperlipidemia)     Prostate cancer (H)     Tear of lateral meniscus of left knee     Vitamin B12 deficiency (non anemic)     Vitamin D deficiency       PAST SURGICAL HISTORY:  has a past surgical history that includes hernia repair (Right, ); colonoscopy (); Colonoscopy (); colonoscopy (); Polypectomy; biopsy; and Wedge resection eyelid (Bilateral, 3/14/2024).  FAMILY HISTORY: family history includes Prostate Cancer in his brother and father.  SOCIAL HISTORY:  reports that he has never smoked. He has never used smokeless tobacco. He reports that he does not currently use alcohol. He reports that he does not use drugs.  SO  last week. One daughter Peg, who lives near [place]. 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 He's a member of [employer].         Current Outpatient Medications   Medication Sig Dispense Refill    apixaban ANTICOAGULANT (ELIQUIS) 5 MG tablet Take 1 tablet (5 mg) by mouth 2 times daily 60 tablet 11    cyanocobalamin (VITAMIN B-12) 1000 MCG tablet Take 1 tablet (1,000 mcg) by mouth daily 30 tablet 11    metoprolol tartrate (LOPRESSOR) 25 MG tablet Take 0.5 tablets (12.5 mg) by mouth 2 times daily 30 tablet 11    sertraline (ZOLOFT) 25 MG tablet Take 1 tablet (25 mg) by mouth daily 30 tablet 11    simvastatin (ZOCOR) 20 MG tablet Take 1 tablet (20 mg) by mouth at bedtime 30 tablet 11    tamsulosin (FLOMAX) 0.4 MG capsule Take 1 capsule (0.4 mg) by mouth at bedtime 30 capsule 11     No current facility-administered medications for this visit.     ROS:  Limited due to dementia. Positives as noted above    Vitals:  /62    Pulse 87   Resp 18   Wt 79.4 kg (175 lb)   SpO2 98%   BMI 23.09 kg/m    Exam:  GENERAL APPEARANCE:  Alert, in no distress  ENT:  [place]  EYES:  conjunctiva and lids normal  NECK:  no adenopathy, no thyromegaly  RESP:  lungs clear to auscultation   CV:  irregular rate and rhythm, no murmur, no LE edema   ABDOMEN:  soft, non-tender, no distension  M/S:   gait steady. BEAN with good strength. No joint inflammation   SKIN:  no rashes or open areas  PSYCH:  oriented to self, daughter, friend, situation, insight and judgement impaired, memory impaired , affect and mood normal, slightly anxious     Lab/Diagnostic Data:  Recent labs in [facility] reviewed by me today.     ASSESSMENT / PLAN:  (G30.9,  F02.80) Alzheimer's disease (H)  (primary encounter diagnosis)  Comment: progressive disease, now requiring a higher level of care  Plan: move to Memory Care this week as planned  Discussed with staff     (F32.A) Depression, unspecified depression type  Comment: slightly anxious, mood appears good   Plan: continue sertraline 25 mg daily, adjust dose as indicated     (I48.20) Chronic atrial fibrillation (H)  Comment: status post cardioversion 8/2023, rhythm irregular. HR in the  today, though he's somewhat anxious   Plan: continue apixaban 5 mg bid, metoprolol tartrate 12.5 mg bid. Follow up with Cardiology as scheduled     (C61) Prostate cancer (H)  BPH   Comment: treated with radiation 12/2019. Followed by [facility], last seen 4/2014.   Plan: continue tamsulosin. Urology follow up per usual schedule     (E78.5) Hyperlipidemia, unspecified hyperlipidemia type  Comment: lipid panel 11/3/2023 within range   Plan: continue simvastatin 20 mg daily     (E53.8) B12 deficiency  Comment: B12 level normal at 881 on 11/3/2023  Plan: continue vitamin B12 1000 mcg daily     (Z71.89) Advanced directives, counseling/discussion  Comment: his healthcare directive was reviewed with his daughter, who confirms DNR, intubation for short term  acute illness only   Plan: POLST completed           Electronically signed by: DASH Isabel CNP

## 2024-06-07 ENCOUNTER — TELEPHONE (OUTPATIENT)
Dept: GERIATRICS | Facility: CLINIC | Age: 79
End: 2024-06-07
Payer: COMMERCIAL

## 2024-06-07 VITALS
OXYGEN SATURATION: 98 % | RESPIRATION RATE: 18 BRPM | WEIGHT: 175 LBS | BODY MASS INDEX: 23.09 KG/M2 | SYSTOLIC BLOOD PRESSURE: 120 MMHG | DIASTOLIC BLOOD PRESSURE: 62 MMHG | HEART RATE: 87 BPM

## 2024-06-07 DIAGNOSIS — G30.9 ALZHEIMER'S DISEASE (H): Primary | ICD-10-CM

## 2024-06-07 DIAGNOSIS — F41.9 ANXIETY: ICD-10-CM

## 2024-06-07 DIAGNOSIS — F02.80 ALZHEIMER'S DISEASE (H): Primary | ICD-10-CM

## 2024-06-07 PROBLEM — D69.6 DISORDER INVOLVING THROMBOCYTOPENIA (H): Status: RESOLVED | Noted: 2024-03-14 | Resolved: 2024-06-07

## 2024-06-07 RX ORDER — GABAPENTIN 100 MG/1
100 CAPSULE ORAL 2 TIMES DAILY PRN
Qty: 30 CAPSULE | Refills: 3 | Status: SHIPPED | OUTPATIENT
Start: 2024-06-07 | End: 2024-07-03 | Stop reason: DRUGHIGH

## 2024-06-07 NOTE — TELEPHONE ENCOUNTER
Young ZIMMERMAN 1945    Orders 2024:  Increase sertraline to 50 mg po daily for depression and anxiety  Gabapentin 100 mg po bid prn anxiety/agitation  Sent to pharmacy  Discussed with daughter      Juma De Souza DASH Love CNP on 2024 at 10:02 AM

## 2024-06-07 NOTE — TELEPHONE ENCOUNTER
Patient moved to Memory Care this week. AL nurse reports increased anxiety, pacing, exit seeking and some agitation. Staff trying to redirect and keep him busy.     Discussed with his daughter Peg Hernandez, who agrees to starting low dose gabapentin prn and increasing sertraline. Potential side effects reviewed. She has made arrangements for daily visits from friends.     Orders sent to pharmacy and AL.     DASH Isabel CNP on 6/7/2024 at 9:59 AM

## 2024-07-02 ENCOUNTER — ASSISTED LIVING VISIT (OUTPATIENT)
Dept: GERIATRICS | Facility: CLINIC | Age: 79
End: 2024-07-02
Payer: COMMERCIAL

## 2024-07-02 VITALS
RESPIRATION RATE: 21 BRPM | WEIGHT: 186.2 LBS | HEART RATE: 106 BPM | SYSTOLIC BLOOD PRESSURE: 100 MMHG | BODY MASS INDEX: 24.68 KG/M2 | OXYGEN SATURATION: 98 % | HEIGHT: 73 IN | DIASTOLIC BLOOD PRESSURE: 81 MMHG | TEMPERATURE: 97.3 F

## 2024-07-02 DIAGNOSIS — F32.A DEPRESSION, UNSPECIFIED DEPRESSION TYPE: ICD-10-CM

## 2024-07-02 DIAGNOSIS — G30.9 ALZHEIMER'S DISEASE (H): Primary | ICD-10-CM

## 2024-07-02 DIAGNOSIS — F02.80 ALZHEIMER'S DISEASE (H): Primary | ICD-10-CM

## 2024-07-02 DIAGNOSIS — F41.9 ANXIETY: ICD-10-CM

## 2024-07-02 DIAGNOSIS — I48.20 CHRONIC ATRIAL FIBRILLATION (H): ICD-10-CM

## 2024-07-02 PROCEDURE — 99349 HOME/RES VST EST MOD MDM 40: CPT | Performed by: NURSE PRACTITIONER

## 2024-07-02 NOTE — LETTER
7/2/2024      Young Tran  [address] [id number]        No notes on file      Sincerely,        DASH Isabel CNP       Patient called stating her MRI is complete. Was told the PA or Dr Herndon would be calling her once the MRI was complete.

## 2024-07-02 NOTE — PROGRESS NOTES
"[facility]    Chief Complaint   Patient presents with    RECHECK     HPI:  Young Tran is a 78 year old  (1945), who is being seen today for an episodic care visit at: THE [facility] ([facility]) [[id number]].   He has lived at this facility since 3/2024, initially in AL with his SO and moved to Memory Care 5/31/2024 following the death of his SO. We assumed care 5/2024.   Medical history significant for Alzheimer's disease, chronic afib,status post cardioversion 8/11/2023, hyperlipidemia, BPH, prostate cancer treated with radiation 12/2019, vitamin B12 deficiency.  He had left eyelid surgery for a basal cell carcinoma 3/14/2024.   [facility]'s [facility] [place] [facility].   Neuropsych testing in 2021 showed prominent amnestic impairment with semantic fluency loss and mild executive impairment. He was evaluated by Behavioral Neurology at [facility] 6/6/2023 for cognitive deficits for 4-5 yrs and diagnosed with Alzheimer's disease. MRI brain 6/15/2023 showed moderate fairly symmetric generalized atrophy, mild to moderate cerebrovascular. disease. He's been on donepezil and memantine, both discontinued.       Today's concern is:      Alzheimer's disease (H)  Anxiety  Depression, unspecified depression type  Chronic atrial fibrillation (H)  He is unable to provide history. Pleasant and talkative, somewhat anxious. Conversation is nonsensical. Ambulates without a device and is very fit. Requires supervision to min assist with cares. Staff reports he's having difficulty adjusting to the unit with anxiety, agitation and exit seeking. He managed to get off the unit and down to the main lobby on one occasion and resisted returning to Memory Care.     Allergies, and PMH/PSH reviewed in EPIC today    REVIEW OF SYSTEMS:  Unable to obtain due to dementia. Positives as noted under HPI.       Objective:   /81   Pulse 106   Temp 97.3  F (36.3  C)   Resp 21   Ht 1.854 m (6' 1\")   Wt 84.5 " "kg (186 lb 3.2 oz)   SpO2 98%   BMI 24.57 kg/m    GENERAL APPEARANCE:  Alert, in no distress  ENT:  [place]  EYES:  conjunctiva and lids normal  RESP:  lungs clear to auscultation   CV:  irregular rate and rhythm, no murmur, no LE edema   ABDOMEN:  soft, non-tender, no distension  M/S:   gait steady. BEAN with good strength. No joint inflammation   SKIN:  no rashes or open areas  PSYCH:  oriented to self, situation, insight and judgement impaired, memory impaired, slightly anxious     Recent labs in [facility] reviewed by me today.     ASSESSMENT / PLAN:  (G30.9,  F02.80) Alzheimer's disease (H)  (primary encounter diagnosis)  (F41.9) Anxiety  (F32.A) Depression, unspecified depression type  Comment: having difficulty adjusting to the unit with anxiety and exit seeking. Becomes agitated when staff redirects away from the doors.   Message left for his daughter Peg Hernandez and we connected 7/3/2024. She is ok with increasing sertraline and gabapentin, is hoping \"stronger\" meds won't be needed  Plan: increase sertraline to 75 mg daily, schedule gabapentin 100 mg daily and 100 mg daily prn. Memory Care staff assistance with cares, meals, activities, med admin  Discussed with staff     (I48.20) Chronic atrial fibrillation (H)  Comment: status post cardioversion 8/2023. HR slightly elevated at 106, BP soft 100/81  Plan: continue apixaban, metoprolol 12.5 mg bid. Cardiology follow up per usual routine       Electronically signed by: DASH Isabel CNP       "

## 2024-07-03 RX ORDER — GABAPENTIN 100 MG/1
100 CAPSULE ORAL DAILY
Qty: 60 CAPSULE | Refills: 11 | Status: SHIPPED | OUTPATIENT
Start: 2024-07-03 | End: 2024-07-12 | Stop reason: DRUGHIGH

## 2024-07-03 NOTE — PROGRESS NOTES
Young Tran   1945    Orders 7/3/2024:  Increase sertraline to 75 mg po daily for depression and anxiety  DISCONTINUE current prn gabapentin order  Start gabapentin 100 mg po daily and 100 mg po daily prn anxiety/agitation/aggression  Discussed with daughter  Sent to pharmacy      Juma Love, APRN CNP on 7/3/2024 at 3:17 PM

## 2024-07-10 ENCOUNTER — LAB REQUISITION (OUTPATIENT)
Dept: LAB | Facility: CLINIC | Age: 79
End: 2024-07-10
Payer: COMMERCIAL

## 2024-07-10 DIAGNOSIS — Z80.49 FAMILY HISTORY OF MALIGNANT NEOPLASM OF OTHER GENITAL ORGANS: ICD-10-CM

## 2024-07-11 LAB
ANION GAP SERPL CALCULATED.3IONS-SCNC: 11 MMOL/L (ref 7–15)
BUN SERPL-MCNC: 25.8 MG/DL (ref 8–23)
CALCIUM SERPL-MCNC: 9.2 MG/DL (ref 8.8–10.2)
CHLORIDE SERPL-SCNC: 105 MMOL/L (ref 98–107)
CREAT SERPL-MCNC: 1.8 MG/DL (ref 0.67–1.17)
DEPRECATED HCO3 PLAS-SCNC: 24 MMOL/L (ref 22–29)
EGFRCR SERPLBLD CKD-EPI 2021: 38 ML/MIN/1.73M2
ERYTHROCYTE [DISTWIDTH] IN BLOOD BY AUTOMATED COUNT: 13.5 % (ref 10–15)
GLUCOSE SERPL-MCNC: 87 MG/DL (ref 70–99)
HCT VFR BLD AUTO: 37.1 % (ref 40–53)
HGB BLD-MCNC: 11.8 G/DL (ref 13.3–17.7)
MCH RBC QN AUTO: 30.3 PG (ref 26.5–33)
MCHC RBC AUTO-ENTMCNC: 31.8 G/DL (ref 31.5–36.5)
MCV RBC AUTO: 95 FL (ref 78–100)
PLATELET # BLD AUTO: 166 10E3/UL (ref 150–450)
POTASSIUM SERPL-SCNC: 4.9 MMOL/L (ref 3.4–5.3)
RBC # BLD AUTO: 3.9 10E6/UL (ref 4.4–5.9)
SODIUM SERPL-SCNC: 140 MMOL/L (ref 135–145)
TSH SERPL DL<=0.005 MIU/L-ACNC: 2.47 UIU/ML (ref 0.3–4.2)
WBC # BLD AUTO: 4.4 10E3/UL (ref 4–11)

## 2024-07-11 PROCEDURE — 36415 COLL VENOUS BLD VENIPUNCTURE: CPT | Mod: ORL | Performed by: NURSE PRACTITIONER

## 2024-07-11 PROCEDURE — 80048 BASIC METABOLIC PNL TOTAL CA: CPT | Mod: ORL | Performed by: NURSE PRACTITIONER

## 2024-07-11 PROCEDURE — 84443 ASSAY THYROID STIM HORMONE: CPT | Mod: ORL | Performed by: NURSE PRACTITIONER

## 2024-07-11 PROCEDURE — 85027 COMPLETE CBC AUTOMATED: CPT | Mod: ORL | Performed by: NURSE PRACTITIONER

## 2024-07-11 PROCEDURE — P9603 ONE-WAY ALLOW PRORATED MILES: HCPCS | Mod: ORL | Performed by: NURSE PRACTITIONER

## 2024-07-12 ENCOUNTER — TELEPHONE (OUTPATIENT)
Dept: GERIATRICS | Facility: CLINIC | Age: 79
End: 2024-07-12
Payer: COMMERCIAL

## 2024-07-12 DIAGNOSIS — F02.80 ALZHEIMER'S DISEASE (H): ICD-10-CM

## 2024-07-12 DIAGNOSIS — G30.9 ALZHEIMER'S DISEASE (H): ICD-10-CM

## 2024-07-12 DIAGNOSIS — F41.9 ANXIETY: Primary | ICD-10-CM

## 2024-07-12 RX ORDER — GABAPENTIN 100 MG/1
CAPSULE ORAL
Qty: 60 CAPSULE | Refills: 11 | Status: SHIPPED | OUTPATIENT
Start: 2024-07-12

## 2024-07-12 NOTE — TELEPHONE ENCOUNTER
Young Tran   1945    Orders 2024:  1 Increase gabapentin to 100 mg po bid plus 100 mg po daily prn anxiety  Sent to pharmacy      DASH Isabel CNP on 2024 at 12:52 PM

## 2024-07-15 NOTE — PROGRESS NOTES
[facility] GERIATRICS    Chief Complaint   Patient presents with    RECHECK     HPI:  Young Tran is a 78 year old  (1945), who is being seen today for an episodic care visit at: THE [facility] ([facility]) [[id number]].   He has lived at this facility since 3/2024, initially in AL with his SO and moved to Memory Care 5/31/2024 following the death of his SO. We assumed care 5/2024.   Medical history significant for Alzheimer's disease, chronic afib,status post cardioversion 8/11/2023, hyperlipidemia, BPH, prostate cancer treated with radiation 12/2019, vitamin B12 deficiency.  He had left eyelid surgery for a basal cell carcinoma 3/14/2024.   He's followed by \[facility]\"" [facility] [place] [facility].   Neuropsych testing in 2021 showed prominent amnestic impairment with semantic fluency loss and mild executive impairment. He was evaluated by Behavioral Neurology at [facility] 6/6/2023 for cognitive deficits for 4-5 yrs and diagnosed with Alzheimer's disease. MRI brain 6/15/2023 showed moderate fairly symmetric generalized atrophy, mild to moderate cerebrovascular. disease. He's been on donepezil and memantine in the past, both discontinued.       Today's concern is:      Alzheimer's disease (H)  Depression, unspecified depression type  Anxiety  Chronic atrial fibrillation (H)  He is unable to provide history. Pleasant and talkative, mostly nonsensical. Slightly anxious, though cooperative. He has managed to elope from the unit multiple times, and made it outside on one occasion. He now has a private . Staff reports he remains anxious with exit seeking and agitation at time. Ambulates without a device. Requires supervision to min assist with cares.     Allergies, and PMH/PSH reviewed in EPIC today    REVIEW OF SYSTEMS:  Unable to obtain due to dementia. Positives as noted under HPI.       Objective:   BP 90/65   Pulse 80   Temp 97.5  F (36.4  C)   Resp 17   Wt 82.8 kg (182 lb 9.6 oz)   BMI  24.09 kg/m    Exam unchanged   GENERAL APPEARANCE:  Alert, in no distress  ENT:  [place]  EYES:  conjunctiva and lids normal  RESP:  lungs clear to auscultation   CV:  irregular rate and rhythm, no murmur, no LE edema   ABDOMEN:  soft, non-tender, no distension  M/S:   gait steady. BEAN with good strength. No joint inflammation   SKIN:  no rashes or open areas  PSYCH:  oriented to self, situation, insight and judgement impaired, memory impaired,  anxious     Recent labs in [facility] reviewed by me today.     ASSESSMENT / PLAN:  (G30.9,  F02.80) Alzheimer's disease (H)  (primary encounter diagnosis)  (F32.A) Depression, unspecified depression type  (F41.9) Anxiety  Comment: some improvement in anxiety since gabapentin was started earlier this month. Requires a private  due to exit seeking. Anxious and agitated at times   Sertraline increased and gabapentin started earlier this month   Plan: spoke with his daughter Peg Hernandez, who would like to avoid sedating meds, if possible. He is a poor candidate for antipsychotics given his history of orthostatic hypotension.  Increase gabapentin to 100 mg tid, continue sertraline 75 mg daily. Memory Care staff assistance with cares, meals, activities, med admin      (I48.20) Chronic atrial fibrillation (H)  Comment: rate usually in the  range, one reading of 122. BPs soft: 90/65, 93/79. No recent reports of dizziness   Status post cardioversion 8/2023  Plan: continue apixaban, metoprolol 12.5 mg bid. Follow up with Cardiology as scheduled         Electronically signed by: DASH Isabel CNP

## 2024-07-16 ENCOUNTER — ASSISTED LIVING VISIT (OUTPATIENT)
Dept: GERIATRICS | Facility: CLINIC | Age: 79
End: 2024-07-16
Payer: COMMERCIAL

## 2024-07-16 DIAGNOSIS — G30.9 ALZHEIMER'S DISEASE (H): Primary | ICD-10-CM

## 2024-07-16 DIAGNOSIS — I48.20 CHRONIC ATRIAL FIBRILLATION (H): ICD-10-CM

## 2024-07-16 DIAGNOSIS — F41.9 ANXIETY: ICD-10-CM

## 2024-07-16 DIAGNOSIS — F32.A DEPRESSION, UNSPECIFIED DEPRESSION TYPE: ICD-10-CM

## 2024-07-16 DIAGNOSIS — F02.80 ALZHEIMER'S DISEASE (H): Primary | ICD-10-CM

## 2024-07-16 PROCEDURE — 99349 HOME/RES VST EST MOD MDM 40: CPT | Performed by: NURSE PRACTITIONER

## 2024-07-16 NOTE — LETTER
7/16/2024      Young Tran  [address] [address]        [facility] GERIATRICS    Chief Complaint   Patient presents with    RECHECK     HPI:  Young Tran is a 78 year old  (1945), who is being seen today for an episodic care visit at: THE [facility] ([facility]) [[id number]]. Today's concern is: ***    Allergies, and PMH/PSH reviewed in [facility] today.  REVIEW OF SYSTEMS:  {qcnsso25:154525}    Objective:   There were no vitals taken for this visit.  {intermediate physical exam :446126}    {fgslab:709305}    Assessment/Plan:  {FGS DX2:971670}    MED REC REQUIRED{TIP  Click the link below to document or use med rec list, use list to pull in response :375271}  Post Medication Reconciliation Status: {MED REC LIST:370654}      Orders:  {fgsorders:098938}  ***    Electronically signed by: Jarrod Small ***          Sincerely,        DASH Isabel CNP

## 2024-08-07 ENCOUNTER — TELEPHONE (OUTPATIENT)
Dept: GERIATRICS | Facility: CLINIC | Age: 79
End: 2024-08-07
Payer: COMMERCIAL

## 2024-08-07 NOTE — TELEPHONE ENCOUNTER
"AL nurse called this afternoon reporting patient has had 3 dizzy spells with near falls today. HR in the 120s and irregular, /74.   History of afib, status post cardioversion 8/11/2023. On apixaban and metoprolol. Followed by [facility] [place] at [facility].   EKG obtained and shows afib, HR 90, \"inferior infarction, probably acute\"  Staff reports patient is calm and in no distress, no chest pain or dyspnea.   Attempted to reach his daughter and left a message. Given his severe dementia, and that he's in no acute distress, will hold off on sending to the ED until discussed with daughter.       DASH Isabel CNP on 8/7/2024 at 3:57 PM    "

## 2024-08-24 VITALS
WEIGHT: 182.6 LBS | TEMPERATURE: 97.5 F | SYSTOLIC BLOOD PRESSURE: 90 MMHG | DIASTOLIC BLOOD PRESSURE: 65 MMHG | HEART RATE: 80 BPM | RESPIRATION RATE: 17 BRPM | BODY MASS INDEX: 24.09 KG/M2

## 2024-08-28 ENCOUNTER — ASSISTED LIVING VISIT (OUTPATIENT)
Dept: GERIATRICS | Facility: CLINIC | Age: 79
End: 2024-08-28
Payer: COMMERCIAL

## 2024-08-28 VITALS
TEMPERATURE: 97.2 F | RESPIRATION RATE: 18 BRPM | HEART RATE: 82 BPM | WEIGHT: 188.8 LBS | BODY MASS INDEX: 24.91 KG/M2 | SYSTOLIC BLOOD PRESSURE: 173 MMHG | DIASTOLIC BLOOD PRESSURE: 169 MMHG

## 2024-08-28 DIAGNOSIS — I48.20 CHRONIC ATRIAL FIBRILLATION (H): ICD-10-CM

## 2024-08-28 DIAGNOSIS — F02.80 ALZHEIMER'S DISEASE (H): Primary | ICD-10-CM

## 2024-08-28 DIAGNOSIS — G30.9 ALZHEIMER'S DISEASE (H): Primary | ICD-10-CM

## 2024-08-28 DIAGNOSIS — F32.A DEPRESSION, UNSPECIFIED DEPRESSION TYPE: ICD-10-CM

## 2024-08-28 DIAGNOSIS — F41.9 ANXIETY: ICD-10-CM

## 2024-08-28 DIAGNOSIS — R42 DIZZINESS: ICD-10-CM

## 2024-08-28 PROCEDURE — 99349 HOME/RES VST EST MOD MDM 40: CPT | Performed by: NURSE PRACTITIONER

## 2024-08-28 NOTE — LETTER
" 8/28/2024      Young Tran  [address] [address]            [facility] GERIATRICS    Chief Complaint   Patient presents with     RECHECK     HPI:  Young Tran is a 78 year old  (1945), who is being seen today for an episodic care visit at: THE [facility]) [[id number]].   He has lived at this facility since 3/2024, initially in AL with his SO and moved to Memory Care 5/31/2024 following the death of his SO. We assumed care 5/2024.   Medical history significant for Alzheimer's disease, chronic afib,status post cardioversion 8/11/2023, hyperlipidemia, BPH, prostate cancer treated with radiation 12/2019, vitamin B12 deficiency.  He had left eyelid surgery for a basal cell carcinoma 3/14/2024.   He's followed by \[facility]\"" [facility] [place] [facility].   Neuropsych testing in 2021 showed prominent amnestic impairment with semantic fluency loss and mild executive impairment. He was evaluated by Behavioral Neurology at [facility] 6/6/2023 for cognitive deficits for 4-5 yrs and diagnosed with Alzheimer's disease. MRI brain 6/15/2023 showed moderate fairly symmetric generalized atrophy, mild to moderate cerebrovascular. disease. He's been on donepezil and memantine in the past, both discontinued.       Today's concern is:      Alzheimer's disease (H)  Anxiety  Depression, unspecified depression type  Chronic atrial fibrillation (H)  Dizziness  He is unable to provide history. Pleasant and talkative, though mostly nonsensical. Reports he had a \"good breakfast.\" Denies pain. Ambulates without a device. Requires supervision to min assist with cares. He has a private  after eloping from the unit multiple times. He managed to get off the unit again 8/26/2024. Staff reports no behavior issues. He is calm today.   He had 3 dizzy spells and near falls on 8/7/2024. HR was in the 120s and irregular on nurse's assessment. EKG obtained and showed afib with HR 90 and " "\"inferior infarction, probably acute.\" He had no chest pain or acute symptoms. Family declined ED eval given his advanced dementia and lack of symptoms. No recurrent dizzy episodes have been noted by staff.       Allergies, and PMH/PSH reviewed in EPIC today    REVIEW OF SYSTEMS:  Unable to obtain due to dementia. Positives as noted under HPI.       Objective:   BP (!) 173/169   Pulse 82   Temp 97.2  F (36.2  C)   Resp 18   Wt 85.6 kg (188 lb 12.8 oz)   BMI 24.91 kg/m    GENERAL APPEARANCE:  Alert, in no distress  ENT:  [place]  EYES:  conjunctiva and lids normal  RESP:  lungs clear to auscultation   CV:  irregular rate and rhythm, no murmur, no LE edema   ABDOMEN:  soft, non-tender, no distension  M/S:   gait steady. BEAN with good strength. No joint inflammation   SKIN:  no visible rashes or open areas   PSYCH:  oriented to self, situation, insight and judgement impaired, memory impaired,  affect normal     Recent labs in [facility] reviewed by me today.     ASSESSMENT / PLAN:  (G30.9,  F02.80) Alzheimer's disease (H)  (primary encounter diagnosis)  (F41.9) Anxiety  (F32.A) Depression, unspecified depression type  Comment: severe deficits. Anxiety and agitation have improved with gabapentin  Plan: continue gabapentin 100 mg tid, sertraline 75 mg daily. Memory Care staff assistance with cares, meals, activities, med admin. Private  due to persistent exit seeking     (I48.20) Chronic atrial fibrillation (H)  Comment: HR:   Status post cardioversion 8/2023  Plan: continue apixaban 5 mg bid, metoprolol tartrate 25 mg bid.     (R42) Dizziness  Comment: chronic, no recent episodes.   Tamsulosin may be contributing, but given history of BPH, will continue for now. Orthostatic hypotension also probably contributing, recent BPs: 105/68, 98/70  Plan: Cardiology follow up as scheduled. Encourage hydration. BMP          Electronically signed by: DASH Isabel CNP "                   Sincerely,        DASH Isabel CNP

## 2024-08-28 NOTE — PROGRESS NOTES
"[facility] GERIATRICS    Chief Complaint   Patient presents with    RECHECK     HPI:  Young Tran is a 78 year old  (1945), who is being seen today for an episodic care visit at: THE [facility] ([facility]) [[id number]].   He has lived at this facility since 3/2024, [place] and moved to Memory Care 5/31/2024 following the death of his SO. We assumed care 5/2024.   Medical history significant for Alzheimer's disease, chronic afib,status post cardioversion 8/11/2023, hyperlipidemia, BPH, prostate cancer treated with radiation 12/2019, vitamin B12 deficiency.  He had left eyelid surgery for a basal cell carcinoma 3/14/2024.   He's followed by [facility].   Neuropsych testing in 2021 showed prominent amnestic impairment with semantic fluency loss and mild executive impairment. He was evaluated by Behavioral Neurology at [facility] 6/6/2023 for cognitive deficits for 4-5 yrs and diagnosed with Alzheimer's disease. MRI brain 6/15/2023 showed moderate fairly symmetric generalized atrophy, mild to moderate cerebrovascular. disease. He's been on donepezil and memantine in the past, both discontinued.       Today's concern is:      Alzheimer's disease (H)  Anxiety  Depression, unspecified depression type  Chronic atrial fibrillation (H)  Dizziness  He is unable to provide history. Pleasant and talkative, though mostly nonsensical. Reports he had a \"good breakfast.\" Denies pain. Ambulates without a device. Requires supervision to min assist with cares. He has a private  after eloping from the unit multiple times. He managed to get off the unit again 8/26/2024. Staff reports no behavior issues. He is calm today.   He had 3 dizzy spells and near falls on 8/7/2024. HR was in the 120s and irregular on nurse's assessment. EKG obtained and showed afib with HR 90 and \"inferior infarction, probably acute.\" He had no chest pain or acute symptoms. Family declined ED " velvet given his advanced dementia and lack of symptoms. No recurrent dizzy episodes have been noted by staff.       Allergies, and PMH/PSH reviewed in EPIC today    REVIEW OF SYSTEMS:  Unable to obtain due to dementia. Positives as noted under HPI.       Objective:   BP (!) 173/169   Pulse 82   Temp 97.2  F (36.2  C)   Resp 18   Wt 85.6 kg (188 lb 12.8 oz)   BMI 24.91 kg/m    GENERAL APPEARANCE:  Alert, in no distress  ENT:  [place]  EYES:  conjunctiva and lids normal  RESP:  lungs clear to auscultation   CV:  irregular rate and rhythm, no murmur, no LE edema   ABDOMEN:  soft, non-tender, no distension  M/S:   gait steady. BEAN with good strength. No joint inflammation   SKIN:  no visible rashes or open areas   PSYCH:  oriented to self, situation, insight and judgement impaired, memory impaired,  affect normal     Recent labs in [facility] reviewed by me today.     ASSESSMENT / PLAN:  (G30.9,  F02.80) Alzheimer's disease (H)  (primary encounter diagnosis)  (F41.9) Anxiety  (F32.A) Depression, unspecified depression type  Comment: severe deficits. Anxiety and agitation have improved with gabapentin  Plan: continue gabapentin 100 mg tid, sertraline 75 mg daily. Memory Care staff assistance with cares, meals, activities, med admin. Private  due to persistent exit seeking     (I48.20) Chronic atrial fibrillation (H)  Comment: HR:   Status post cardioversion 8/2023  Plan: continue apixaban 5 mg bid, metoprolol tartrate 25 mg bid.     (R42) Dizziness  Comment: chronic, no recent episodes.   Tamsulosin may be contributing, but given history of BPH, will continue for now. Orthostatic hypotension also probably contributing, recent BPs: 105/68, 98/70  Plan: Cardiology follow up as scheduled. Encourage hydration. BMP          Electronically signed by: DASH Isabel CNP

## 2024-09-09 ENCOUNTER — LAB REQUISITION (OUTPATIENT)
Dept: LAB | Facility: CLINIC | Age: 79
End: 2024-09-09
Payer: COMMERCIAL

## 2024-09-09 DIAGNOSIS — N17.9 ACUTE KIDNEY FAILURE, UNSPECIFIED (H): ICD-10-CM

## 2024-09-12 LAB
ANION GAP SERPL CALCULATED.3IONS-SCNC: 10 MMOL/L (ref 7–15)
BUN SERPL-MCNC: 27.1 MG/DL (ref 8–23)
CALCIUM SERPL-MCNC: 9 MG/DL (ref 8.8–10.4)
CHLORIDE SERPL-SCNC: 109 MMOL/L (ref 98–107)
CREAT SERPL-MCNC: 1.88 MG/DL (ref 0.67–1.17)
EGFRCR SERPLBLD CKD-EPI 2021: 36 ML/MIN/1.73M2
GLUCOSE SERPL-MCNC: 91 MG/DL (ref 70–99)
HCO3 SERPL-SCNC: 21 MMOL/L (ref 22–29)
POTASSIUM SERPL-SCNC: 4.3 MMOL/L (ref 3.4–5.3)
SODIUM SERPL-SCNC: 140 MMOL/L (ref 135–145)

## 2024-09-12 PROCEDURE — 80048 BASIC METABOLIC PNL TOTAL CA: CPT | Mod: ORL | Performed by: NURSE PRACTITIONER

## 2024-09-12 PROCEDURE — P9604 ONE-WAY ALLOW PRORATED TRIP: HCPCS | Mod: ORL | Performed by: NURSE PRACTITIONER

## 2024-09-12 PROCEDURE — 36415 COLL VENOUS BLD VENIPUNCTURE: CPT | Mod: ORL | Performed by: NURSE PRACTITIONER

## 2024-09-26 ENCOUNTER — ASSISTED LIVING VISIT (OUTPATIENT)
Dept: GERIATRICS | Facility: CLINIC | Age: 79
End: 2024-09-26
Payer: COMMERCIAL

## 2024-09-26 VITALS
DIASTOLIC BLOOD PRESSURE: 50 MMHG | HEART RATE: 65 BPM | TEMPERATURE: 97.2 F | SYSTOLIC BLOOD PRESSURE: 97 MMHG | WEIGHT: 187.4 LBS | RESPIRATION RATE: 18 BRPM | BODY MASS INDEX: 24.72 KG/M2

## 2024-09-26 DIAGNOSIS — R42 DIZZINESS: ICD-10-CM

## 2024-09-26 DIAGNOSIS — F02.80 ALZHEIMER'S DISEASE (H): Primary | ICD-10-CM

## 2024-09-26 DIAGNOSIS — F41.9 ANXIETY: ICD-10-CM

## 2024-09-26 DIAGNOSIS — F03.918 DEMENTIA WITH AGGRESSIVE BEHAVIOR (H): ICD-10-CM

## 2024-09-26 DIAGNOSIS — I48.20 CHRONIC ATRIAL FIBRILLATION (H): ICD-10-CM

## 2024-09-26 DIAGNOSIS — G30.9 ALZHEIMER'S DISEASE (H): Primary | ICD-10-CM

## 2024-09-26 DIAGNOSIS — F32.A DEPRESSION, UNSPECIFIED DEPRESSION TYPE: ICD-10-CM

## 2024-09-26 PROCEDURE — 99349 HOME/RES VST EST MOD MDM 40: CPT | Performed by: NURSE PRACTITIONER

## 2024-09-26 RX ORDER — QUETIAPINE FUMARATE 25 MG/1
12.5 TABLET, FILM COATED ORAL DAILY PRN
Qty: 15 TABLET | Refills: 11 | Status: SHIPPED | OUTPATIENT
Start: 2024-09-26
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
## 2024-09-30 ENCOUNTER — TELEPHONE (OUTPATIENT)
Dept: GERIATRICS | Facility: CLINIC | Age: 79
End: 2024-09-30
Payer: COMMERCIAL

## 2024-09-30 NOTE — TELEPHONE ENCOUNTER
Prior Authorization Retail Medication Request    Medication/Dose: QUEtiapine (SEROQUEL) 25 MG tablet  Diagnosis and ICD code (if different than what is on RX):    Alzheimer's disease (H) [G30.9, F02.80]  - Primary      Dementia with aggressive behavior (H) [F03.918]        New/renewal/insurance change PA/secondary ins. PA:  Previously Tried and Failed:    Rationale:      Insurance   Primary: [facility] MEDICARE ADVANTAGE  Insurance ID:  [id number]      Secondary (if applicable):  Insurance ID:      Pharmacy Information (if different than what is on RX)  Name:    Phone:    Fax:    Clinic Information  Preferred routing pool for dept communication:

## 2024-10-02 ENCOUNTER — HOSPITAL ENCOUNTER (INPATIENT)
Facility: CLINIC | Age: 79
LOS: 2 days | Discharge: NURSING, REHAB OR LONG-TERM CARE | DRG: 378 | End: 2024-10-04
Attending: INTERNAL MEDICINE | Admitting: STUDENT IN AN ORGANIZED HEALTH CARE EDUCATION/TRAINING PROGRAM
Payer: COMMERCIAL

## 2024-10-02 ENCOUNTER — APPOINTMENT (OUTPATIENT)
Dept: GENERAL RADIOLOGY | Facility: CLINIC | Age: 79
DRG: 378 | End: 2024-10-02
Payer: COMMERCIAL

## 2024-10-02 ENCOUNTER — APPOINTMENT (OUTPATIENT)
Dept: CT IMAGING | Facility: CLINIC | Age: 79
DRG: 378 | End: 2024-10-02
Payer: COMMERCIAL

## 2024-10-02 DIAGNOSIS — Z79.01 LONG TERM (CURRENT) USE OF ANTICOAGULANTS: Primary | ICD-10-CM

## 2024-10-02 DIAGNOSIS — D64.9 NORMOCYTIC ANEMIA: ICD-10-CM

## 2024-10-02 DIAGNOSIS — K92.2 GASTROINTESTINAL HEMORRHAGE, UNSPECIFIED GASTROINTESTINAL HEMORRHAGE TYPE: ICD-10-CM

## 2024-10-02 DIAGNOSIS — I48.91 ATRIAL FIBRILLATION, UNSPECIFIED TYPE (H): ICD-10-CM

## 2024-10-02 DIAGNOSIS — K92.1 HEMATOCHEZIA: ICD-10-CM

## 2024-10-02 LAB
ABO/RH(D): NORMAL
ALBUMIN SERPL BCG-MCNC: 3.8 G/DL (ref 3.5–5.2)
ALP SERPL-CCNC: 45 U/L (ref 40–150)
ALT SERPL W P-5'-P-CCNC: 12 U/L (ref 0–70)
ANION GAP SERPL CALCULATED.3IONS-SCNC: 8 MMOL/L (ref 7–15)
ANTIBODY SCREEN: NEGATIVE
AST SERPL W P-5'-P-CCNC: 18 U/L (ref 0–45)
ATRIAL RATE - MUSE: 53 BPM
BASOPHILS # BLD AUTO: 0 10E3/UL (ref 0–0.2)
BASOPHILS # BLD AUTO: 0 10E3/UL (ref 0–0.2)
BASOPHILS NFR BLD AUTO: 1 %
BASOPHILS NFR BLD AUTO: 1 %
BILIRUB SERPL-MCNC: 0.3 MG/DL
BUN SERPL-MCNC: 23.2 MG/DL (ref 8–23)
CALCIUM SERPL-MCNC: 8.5 MG/DL (ref 8.8–10.4)
CHLORIDE SERPL-SCNC: 110 MMOL/L (ref 98–107)
CREAT SERPL-MCNC: 1.86 MG/DL (ref 0.67–1.17)
DIASTOLIC BLOOD PRESSURE - MUSE: NORMAL MMHG
DIGOXIN SERPL-MCNC: 0.8 NG/ML (ref 0.6–2)
DIGOXIN SERPL-MCNC: 0.9 NG/ML (ref 0.6–2)
EGFRCR SERPLBLD CKD-EPI 2021: 37 ML/MIN/1.73M2
EOSINOPHIL # BLD AUTO: 0.1 10E3/UL (ref 0–0.7)
EOSINOPHIL # BLD AUTO: 0.1 10E3/UL (ref 0–0.7)
EOSINOPHIL NFR BLD AUTO: 2 %
EOSINOPHIL NFR BLD AUTO: 4 %
ERYTHROCYTE [DISTWIDTH] IN BLOOD BY AUTOMATED COUNT: 15.9 % (ref 10–15)
ERYTHROCYTE [DISTWIDTH] IN BLOOD BY AUTOMATED COUNT: 15.9 % (ref 10–15)
FERRITIN SERPL-MCNC: 16 NG/ML (ref 31–409)
FOLATE SERPL-MCNC: 11.1 NG/ML (ref 4.6–34.8)
GLUCOSE SERPL-MCNC: 102 MG/DL (ref 70–99)
HAPTOGLOB SERPL-MCNC: 176 MG/DL (ref 30–200)
HCO3 SERPL-SCNC: 22 MMOL/L (ref 22–29)
HCT VFR BLD AUTO: 24 % (ref 40–53)
HCT VFR BLD AUTO: 24.8 % (ref 40–53)
HGB BLD-MCNC: 7.5 G/DL (ref 13.3–17.7)
HGB BLD-MCNC: 7.6 G/DL (ref 13.3–17.7)
HGB BLD-MCNC: 7.9 G/DL (ref 13.3–17.7)
HOLD SPECIMEN: NORMAL
IMM GRANULOCYTES # BLD: 0 10E3/UL
IMM GRANULOCYTES # BLD: 0 10E3/UL
IMM GRANULOCYTES NFR BLD: 0 %
IMM GRANULOCYTES NFR BLD: 1 %
INTERPRETATION ECG - MUSE: NORMAL
IRON BINDING CAPACITY (ROCHE): 338 UG/DL (ref 240–430)
IRON SATN MFR SERPL: 6 % (ref 15–46)
IRON SERPL-MCNC: 20 UG/DL (ref 61–157)
LDH SERPL L TO P-CCNC: 201 U/L (ref 0–250)
LYMPHOCYTES # BLD AUTO: 0.4 10E3/UL (ref 0.8–5.3)
LYMPHOCYTES # BLD AUTO: 0.6 10E3/UL (ref 0.8–5.3)
LYMPHOCYTES NFR BLD AUTO: 11 %
LYMPHOCYTES NFR BLD AUTO: 15 %
MCH RBC QN AUTO: 29.5 PG (ref 26.5–33)
MCH RBC QN AUTO: 29.5 PG (ref 26.5–33)
MCHC RBC AUTO-ENTMCNC: 31.7 G/DL (ref 31.5–36.5)
MCHC RBC AUTO-ENTMCNC: 31.9 G/DL (ref 31.5–36.5)
MCV RBC AUTO: 93 FL (ref 78–100)
MCV RBC AUTO: 93 FL (ref 78–100)
MONOCYTES # BLD AUTO: 0.4 10E3/UL (ref 0–1.3)
MONOCYTES # BLD AUTO: 0.6 10E3/UL (ref 0–1.3)
MONOCYTES NFR BLD AUTO: 11 %
MONOCYTES NFR BLD AUTO: 14 %
NEUTROPHILS # BLD AUTO: 2.8 10E3/UL (ref 1.6–8.3)
NEUTROPHILS # BLD AUTO: 2.9 10E3/UL (ref 1.6–8.3)
NEUTROPHILS NFR BLD AUTO: 70 %
NEUTROPHILS NFR BLD AUTO: 71 %
NRBC # BLD AUTO: 0 10E3/UL
NRBC # BLD AUTO: 0 10E3/UL
NRBC BLD AUTO-RTO: 0 /100
NRBC BLD AUTO-RTO: 0 /100
P AXIS - MUSE: 75 DEGREES
PLATELET # BLD AUTO: 137 10E3/UL (ref 150–450)
PLATELET # BLD AUTO: 149 10E3/UL (ref 150–450)
POTASSIUM SERPL-SCNC: 4.8 MMOL/L (ref 3.4–5.3)
PR INTERVAL - MUSE: 198 MS
PROT SERPL-MCNC: 6.2 G/DL (ref 6.4–8.3)
QRS DURATION - MUSE: 88 MS
QT - MUSE: 438 MS
QTC - MUSE: 410 MS
R AXIS - MUSE: -42 DEGREES
RBC # BLD AUTO: 2.58 10E6/UL (ref 4.4–5.9)
RBC # BLD AUTO: 2.68 10E6/UL (ref 4.4–5.9)
RETICS # AUTO: 0.04 10E6/UL (ref 0.03–0.1)
RETICS # AUTO: 0.05 10E6/UL (ref 0.03–0.1)
RETICS/RBC NFR AUTO: 1.6 % (ref 0.5–2)
RETICS/RBC NFR AUTO: 1.9 % (ref 0.5–2)
SODIUM SERPL-SCNC: 140 MMOL/L (ref 135–145)
SPECIMEN EXPIRATION DATE: NORMAL
SYSTOLIC BLOOD PRESSURE - MUSE: NORMAL MMHG
T AXIS - MUSE: 6 DEGREES
TROPONIN T SERPL HS-MCNC: 18 NG/L
TROPONIN T SERPL HS-MCNC: 22 NG/L
TSH SERPL DL<=0.005 MIU/L-ACNC: 1.94 UIU/ML (ref 0.3–4.2)
VENTRICULAR RATE- MUSE: 53 BPM
VIT B12 SERPL-MCNC: 1106 PG/ML (ref 232–1245)
WBC # BLD AUTO: 4 10E3/UL (ref 4–11)
WBC # BLD AUTO: 4 10E3/UL (ref 4–11)

## 2024-10-02 PROCEDURE — 85045 AUTOMATED RETICULOCYTE COUNT: CPT | Performed by: INTERNAL MEDICINE

## 2024-10-02 PROCEDURE — 84484 ASSAY OF TROPONIN QUANT: CPT | Performed by: INTERNAL MEDICINE

## 2024-10-02 PROCEDURE — 120N000002 HC R&B MED SURG/OB UMMC

## 2024-10-02 PROCEDURE — 71046 X-RAY EXAM CHEST 2 VIEWS: CPT | Mod: 26 | Performed by: STUDENT IN AN ORGANIZED HEALTH CARE EDUCATION/TRAINING PROGRAM

## 2024-10-02 PROCEDURE — 36415 COLL VENOUS BLD VENIPUNCTURE: CPT

## 2024-10-02 PROCEDURE — 250N000009 HC RX 250

## 2024-10-02 PROCEDURE — 93005 ELECTROCARDIOGRAM TRACING: CPT | Performed by: INTERNAL MEDICINE

## 2024-10-02 PROCEDURE — 82310 ASSAY OF CALCIUM: CPT

## 2024-10-02 PROCEDURE — 99285 EMERGENCY DEPT VISIT HI MDM: CPT | Performed by: INTERNAL MEDICINE

## 2024-10-02 PROCEDURE — 99285 EMERGENCY DEPT VISIT HI MDM: CPT | Mod: 25 | Performed by: INTERNAL MEDICINE

## 2024-10-02 PROCEDURE — 83615 LACTATE (LD) (LDH) ENZYME: CPT

## 2024-10-02 PROCEDURE — 93010 ELECTROCARDIOGRAM REPORT: CPT | Performed by: INTERNAL MEDICINE

## 2024-10-02 PROCEDURE — 82607 VITAMIN B-12: CPT | Performed by: INTERNAL MEDICINE

## 2024-10-02 PROCEDURE — 83010 ASSAY OF HAPTOGLOBIN QUANT: CPT

## 2024-10-02 PROCEDURE — 70450 CT HEAD/BRAIN W/O DYE: CPT

## 2024-10-02 PROCEDURE — 84443 ASSAY THYROID STIM HORMONE: CPT

## 2024-10-02 PROCEDURE — 36415 COLL VENOUS BLD VENIPUNCTURE: CPT | Performed by: INTERNAL MEDICINE

## 2024-10-02 PROCEDURE — 71046 X-RAY EXAM CHEST 2 VIEWS: CPT

## 2024-10-02 PROCEDURE — 80162 ASSAY OF DIGOXIN TOTAL: CPT | Performed by: INTERNAL MEDICINE

## 2024-10-02 PROCEDURE — 82728 ASSAY OF FERRITIN: CPT

## 2024-10-02 PROCEDURE — 85060 BLOOD SMEAR INTERPRETATION: CPT | Performed by: PATHOLOGY

## 2024-10-02 PROCEDURE — 85004 AUTOMATED DIFF WBC COUNT: CPT

## 2024-10-02 PROCEDURE — 99223 1ST HOSP IP/OBS HIGH 75: CPT | Mod: FS | Performed by: INTERNAL MEDICINE

## 2024-10-02 PROCEDURE — 70450 CT HEAD/BRAIN W/O DYE: CPT | Mod: 26 | Performed by: RADIOLOGY

## 2024-10-02 PROCEDURE — 85018 HEMOGLOBIN: CPT

## 2024-10-02 PROCEDURE — 85025 COMPLETE CBC W/AUTO DIFF WBC: CPT | Performed by: INTERNAL MEDICINE

## 2024-10-02 PROCEDURE — 96360 HYDRATION IV INFUSION INIT: CPT | Performed by: INTERNAL MEDICINE

## 2024-10-02 PROCEDURE — 258N000003 HC RX IP 258 OP 636

## 2024-10-02 PROCEDURE — 86850 RBC ANTIBODY SCREEN: CPT

## 2024-10-02 PROCEDURE — 83921 ORGANIC ACID SINGLE QUANT: CPT

## 2024-10-02 PROCEDURE — 82746 ASSAY OF FOLIC ACID SERUM: CPT | Performed by: INTERNAL MEDICINE

## 2024-10-02 PROCEDURE — 83550 IRON BINDING TEST: CPT | Performed by: INTERNAL MEDICINE

## 2024-10-02 PROCEDURE — 80162 ASSAY OF DIGOXIN TOTAL: CPT

## 2024-10-02 PROCEDURE — 86900 BLOOD TYPING SEROLOGIC ABO: CPT

## 2024-10-02 PROCEDURE — 99222 1ST HOSP IP/OBS MODERATE 55: CPT | Mod: AI | Performed by: STUDENT IN AN ORGANIZED HEALTH CARE EDUCATION/TRAINING PROGRAM

## 2024-10-02 RX ORDER — UREA 10 %
1000 LOTION (ML) TOPICAL DAILY
Status: DISCONTINUED | OUTPATIENT
Start: 2024-10-03 | End: 2024-10-04 | Stop reason: HOSPADM

## 2024-10-02 RX ORDER — DIGOXIN 125 MCG
125 TABLET ORAL DAILY
COMMUNITY
End: 2024-10-04

## 2024-10-02 RX ORDER — CALCIUM CARBONATE 500 MG/1
1000 TABLET, CHEWABLE ORAL 4 TIMES DAILY PRN
Status: DISCONTINUED | OUTPATIENT
Start: 2024-10-02 | End: 2024-10-04 | Stop reason: HOSPADM

## 2024-10-02 RX ORDER — LIDOCAINE 40 MG/G
CREAM TOPICAL
Status: DISCONTINUED | OUTPATIENT
Start: 2024-10-02 | End: 2024-10-04 | Stop reason: HOSPADM

## 2024-10-02 RX ORDER — DIGOXIN 125 MCG
125 TABLET ORAL DAILY
Status: DISCONTINUED | OUTPATIENT
Start: 2024-10-03 | End: 2024-10-04 | Stop reason: HOSPADM

## 2024-10-02 RX ORDER — POLYETHYLENE GLYCOL 3350 17 G/17G
17 POWDER, FOR SOLUTION ORAL 2 TIMES DAILY PRN
Status: DISCONTINUED | OUTPATIENT
Start: 2024-10-02 | End: 2024-10-04

## 2024-10-02 RX ORDER — GABAPENTIN 100 MG/1
100 CAPSULE ORAL 3 TIMES DAILY
Status: DISCONTINUED | OUTPATIENT
Start: 2024-10-02 | End: 2024-10-04 | Stop reason: HOSPADM

## 2024-10-02 RX ORDER — AMOXICILLIN 250 MG
2 CAPSULE ORAL 2 TIMES DAILY PRN
Status: DISCONTINUED | OUTPATIENT
Start: 2024-10-02 | End: 2024-10-04

## 2024-10-02 RX ORDER — AMOXICILLIN 250 MG
1 CAPSULE ORAL 2 TIMES DAILY PRN
Status: DISCONTINUED | OUTPATIENT
Start: 2024-10-02 | End: 2024-10-04

## 2024-10-02 RX ORDER — TAMSULOSIN HYDROCHLORIDE 0.4 MG/1
0.4 CAPSULE ORAL AT BEDTIME
Status: DISCONTINUED | OUTPATIENT
Start: 2024-10-02 | End: 2024-10-04 | Stop reason: HOSPADM

## 2024-10-02 RX ORDER — SIMVASTATIN 20 MG
20 TABLET ORAL AT BEDTIME
Status: DISCONTINUED | OUTPATIENT
Start: 2024-10-02 | End: 2024-10-04 | Stop reason: HOSPADM

## 2024-10-02 RX ADMIN — SODIUM CHLORIDE 1000 ML: 9 INJECTION, SOLUTION INTRAVENOUS at 10:53

## 2024-10-02 RX ADMIN — PANTOPRAZOLE SODIUM 40 MG: 40 INJECTION, POWDER, FOR SOLUTION INTRAVENOUS at 15:05

## 2024-10-02 RX ADMIN — PANTOPRAZOLE SODIUM 40 MG: 40 INJECTION, POWDER, FOR SOLUTION INTRAVENOUS at 20:05

## 2024-10-02 ASSESSMENT — ACTIVITIES OF DAILY LIVING (ADL)
ADLS_ACUITY_SCORE: 37
ADLS_ACUITY_SCORE: 35
ADLS_ACUITY_SCORE: 37
ADLS_ACUITY_SCORE: 35
ADLS_ACUITY_SCORE: 37
ADLS_ACUITY_SCORE: 35

## 2024-10-02 NOTE — ED PROVIDER NOTES
ED Provider Note  [facility]      History     Chief Complaint   Patient presents with    Dizziness     The history is provided by the patient, the nursing home and medical records.     Young Tran is a 79 year old male w/ PMH of Alzheimer's disease, atrial fibrillation on chronic anticoagulation, HLD, BPH  and prostate cancer (s/p radiation 12/2019) who presents to the ED from assisted living ([facility]) for reported dizziness. He was noted to be bradycardic and hypotensive w/ EMS and received a 500ml NS bolus en route.     On initial evaluation, patient A&Ox1; able to tell me his name but unable to tell me where he is or what year it is. He seems pleasantly confused with largely nonsensical speech. He reports no symptoms, specifically denying any lightheadedness, dizziness, fever, chills, cough, congestion, headache, chest pain, shortness of breath, abdominal pain, nausea, vomiting, diarrhea, blood in his stool or urine, difficulty urinating, pain with urination, or leg swelling. He does mention being confused this morning and being on the floor for a period of time but denies any recent falls.     Per chart review, patient seen by Cardiology 9/5/2024 for chronic afib at which time a zio patch was placed with plan to continue for two weeks. Due to rates in the 100s, digoxin 0.125mg daily was added as this time.     Per assisted living staff, patient had two episodes of dizziness this morning resulting in him grabbing/leaning into the wall and lowering himself to the floor. He did not hit his head and did not loose consciousness. Blood pressure measured after the second episode while sitting was 87/46 and while standing was ?170s systolic. He has had a number of similar episodes in the recent past. He otherwise has been in his normal state of health, eating and drinking well, with no recent illness or events. The zio patch was just sent in for evaluation. At  baseline, the patient is oriented only to self but is pleasant and conversational.     Past Medical History  Past Medical History:   Diagnosis Date    Alzheimer's disease (H)     Chronic a-fib (H)     Dupuytren contracture     Elevated prostate specific antigen (PSA)     History of colonic polyps     HLD (hyperlipidemia)     Prostate cancer (H)     radiation therapy 12/2019    Tear of lateral meniscus of left knee     Vitamin B12 deficiency (non anemic)     Vitamin D deficiency      Past Surgical History:   Procedure Laterality Date    BIOPSY      prostate x4    COLONOSCOPY  2012    COLONOSCOPY  2009    COLONOSCOPY  2007    HERNIA REPAIR Right 1998    POLYPECTOMY      WEDGE RESECTION EYELID Bilateral 3/14/2024    Procedure: Left Lower Lid Wedge Excision of Lesion with Frozen Section Control and Reconstruction;  Surgeon: Suhas Flores MD;  Location: [facility] OR     apixaban ANTICOAGULANT (ELIQUIS) 5 MG tablet  cyanocobalamin (VITAMIN B-12) 1000 MCG tablet  digoxin (LANOXIN) 125 MCG tablet  gabapentin (NEURONTIN) 100 MG capsule  metoprolol tartrate (LOPRESSOR) 25 MG tablet  QUEtiapine (SEROQUEL) 25 MG tablet  sertraline (ZOLOFT) 50 MG tablet  simvastatin (ZOCOR) 20 MG tablet  tamsulosin (FLOMAX) 0.4 MG capsule      No Known Allergies  Family History  Family History   Problem Relation Age of Onset    Prostate Cancer Father     Prostate Cancer Brother      Social History   Social History     Tobacco Use    Smoking status: Never    Smokeless tobacco: Never   Substance Use Topics    Alcohol use: Not Currently     Comment: 1-2 drinks per week    Drug use: Never      Past medical history, past surgical history, medications, allergies, family history, and social history were reviewed with the patient. No additional pertinent items.   A medically appropriate review of systems was performed with pertinent positives and negatives noted in the HPI, and all other systems negative.    Physical Exam   BP: 98/55  Pulse: 71  Temp: 97.8   F (36.6  C)  Resp: 16  SpO2: 100 %  Physical Exam  Constitutional:       General: He is not in acute distress.     Appearance: Normal appearance. He is not toxic-appearing.   HENT:      Head: Normocephalic and atraumatic.      Mouth/Throat:      Mouth: Mucous membranes are moist.   Eyes:      Extraocular Movements: Extraocular movements intact.      Conjunctiva/sclera: Conjunctivae normal.      Pupils: Pupils are equal, round, and reactive to light.   Cardiovascular:      Rate and Rhythm: Regular rhythm. Bradycardia present.      Pulses: Normal pulses.      Heart sounds: No murmur heard.  Pulmonary:      Effort: Pulmonary effort is normal. No respiratory distress.      Breath sounds: Normal breath sounds.   Abdominal:      General: There is no distension.      Palpations: Abdomen is soft.      Tenderness: There is no abdominal tenderness. There is no guarding or rebound.   Genitourinary:     Rectum: Guaiac result positive (JIE w/ dried blood near anus, empty rectal vault, very minimal red tinged stool).   Musculoskeletal:      Right lower leg: No edema.      Left lower leg: No edema.   Skin:     General: Skin is warm and dry.      Findings: No rash.   Neurological:      General: No focal deficit present.      Mental Status: He is alert. Mental status is at baseline.      Comments: Alert to self, unable to tell me where we are or what the year is   Psychiatric:         Mood and Affect: Mood normal.         Behavior: Behavior normal.       ED Course, Procedures, & Data     ED Course as of 10/02/24 1429   Wed Oct 02, 2024   1119 CBC with platelets differential(!)  No leukocytosis. Hb 7.9, down from 11.8 on 7/11/2024   1137 Comprehensive metabolic panel(!)  Cr at recent baseline   1138 Digoxin level  Digoxin level in therapeutic range   1138 Troponin T, High Sensitivity  Trop 22, will trend   1200 CT Head w/o Contrast  No acute intracranial abnormality   1200 XR Chest 2 Views  No acute cardiopulmonary abnormality    1242 JIE positive for red-tinged stool, hemoccult positive, page out to GI -> recommend admission for monitoring of bleeding in s/o Hb drop     Procedures            EKG Interpretation:      Interpreted by Geovany Corona MD  Time reviewed: 11:00  Symptoms at time of EKG: None   Rhythm: sinus  Rate: bradycardia  Axis: left axis deviation  Ectopy: none  Conduction: normal  ST Segments/ T Waves: No ST-T wave changes  Q Waves: none  Comparison to prior: No old EKG available    Clinical Impression: normal EKG       Results for orders placed or performed during the hospital encounter of 10/02/24   CT Head w/o Contrast     Status: None    Narrative    EXAM: CT HEAD W/O CONTRAST  10/2/2024 11:30 AM     HISTORY:  dizziness, chronic anticoagulation       COMPARISON:  None    TECHNIQUE: Using multidetector thin collimation helical acquisition  technique, axial, coronal and sagittal CT images from the skull base  to the vertex were obtained without intravenous contrast.   (topogram) image(s) also obtained and reviewed.    FINDINGS:  No acute intracranial hemorrhage, mass effect, or midline shift. No  acute loss of gray-white matter differentiation in the cerebral  hemispheres. Ventricles are proportionate to the cerebral sulci. Clear  basal cisterns. Hypoattenuation of the periventricular white matter  which is nonspecific but favored to represent chronic small vessel  ischemic disease given patient's age. Moderate generalized parenchymal  atrophy.    The bony calvaria and the bones of the skull base are normal. Mucous  retention cysts in the left maxillary sinus. The mastoid air cells are  clear. Grossly normal orbits.       Impression    IMPRESSION:   1. No acute intracranial pathology.   2. Findings suggestive of moderate chronic small vessel ischemic  disease.    I have personally reviewed the examination and initial interpretation  and I agree with the findings.    CATHY GOSS MD         SYSTEM ID:  [id number]    XR Chest 2 Views     Status: None    Narrative    Exam: XR CHEST 2 VIEWS, 10/2/2024 11:34 AM    Indication: dizzy, weakness    Comparison: None    Findings:     Frontal and lateral projection radiograph of the chest. Apparent  elevation of the left hemidiaphragm. No discernible pneumothorax.  Atherosclerotic calcification of the thoracic aorta. Streaky bilateral  basilar predominant pulmonary opacities      Impression    Impression:     1.Streaky bilateral basilar predominant pulmonary opacities, may  represent atelectasis/pulmonary edema and/ or infiltrate.  2. Apparent elevation of the left hemidiaphragm    DAISHA LINO MD         SYSTEM ID:  [id number]   [place] Draw     Status: None    Narrative    The following orders were created for panel order [place] [facility].  Procedure                               Abnormality         Status                     ---------                               -----------         ------                     Extra Blue Top Tube[[id number]]                              Final result               Extra Red Top Tube[[id number]]                               Final result               Extra Green Top (Lithium...[[id number]]                      Final result               Extra Purple Top Tube[[id number]]                            Final result                 Please view results for these tests on the individual orders.   Extra Blue Top Tube     Status: None   Result Value Ref Range    Hold Specimen JIC    Extra Red Top Tube     Status: None   Result Value Ref Range    Hold Specimen JIC    Extra Green Top (Lithium Heparin) Tube     Status: None   Result Value Ref Range    Hold Specimen JIC    Extra Purple Top Tube     Status: None   Result Value Ref Range    Hold Specimen JIC    Comprehensive metabolic panel     Status: Abnormal   Result Value Ref Range    Sodium 140 135 - 145 mmol/L    Potassium 4.8 3.4 - 5.3 mmol/L    Carbon Dioxide (CO2) 22 22 - 29 mmol/L    Anion Gap 8 7 - 15 mmol/L     Urea Nitrogen 23.2 (H) 8.0 - 23.0 mg/dL    Creatinine 1.86 (H) 0.67 - 1.17 mg/dL    GFR Estimate 37 (L) >60 mL/min/1.73m2    Calcium 8.5 (L) 8.8 - 10.4 mg/dL    Chloride 110 (H) 98 - 107 mmol/L    Glucose 102 (H) 70 - 99 mg/dL    Alkaline Phosphatase 45 40 - 150 U/L    AST 18 0 - 45 U/L    ALT 12 0 - 70 U/L    Protein Total 6.2 (L) 6.4 - 8.3 g/dL    Albumin 3.8 3.5 - 5.2 g/dL    Bilirubin Total 0.3 <=1.2 mg/dL   CBC with platelets and differential     Status: Abnormal   Result Value Ref Range    WBC Count 4.0 4.0 - 11.0 10e3/uL    RBC Count 2.68 (L) 4.40 - 5.90 10e6/uL    Hemoglobin 7.9 (L) 13.3 - 17.7 g/dL    Hematocrit 24.8 (L) 40.0 - 53.0 %    MCV 93 78 - 100 fL    MCH 29.5 26.5 - 33.0 pg    MCHC 31.9 31.5 - 36.5 g/dL    RDW 15.9 (H) 10.0 - 15.0 %    Platelet Count 149 (L) 150 - 450 10e3/uL    % Neutrophils 70 %    % Lymphocytes 11 %    % Monocytes 14 %    % Eosinophils 4 %    % Basophils 1 %    % Immature Granulocytes 1 %    NRBCs per 100 WBC 0 <1 /100    Absolute Neutrophils 2.8 1.6 - 8.3 10e3/uL    Absolute Lymphocytes 0.4 (L) 0.8 - 5.3 10e3/uL    Absolute Monocytes 0.6 0.0 - 1.3 10e3/uL    Absolute Eosinophils 0.1 0.0 - 0.7 10e3/uL    Absolute Basophils 0.0 0.0 - 0.2 10e3/uL    Absolute Immature Granulocytes 0.0 <=0.4 10e3/uL    Absolute NRBCs 0.0 10e3/uL   Troponin T, High Sensitivity     Status: Normal   Result Value Ref Range    Troponin T, High Sensitivity 22 <=22 ng/L   Digoxin level     Status: Normal   Result Value Ref Range    Digoxin 0.9 0.6 - 2.0 ng/mL   Troponin T, High Sensitivity     Status: Normal   Result Value Ref Range    Troponin T, High Sensitivity 18 <=22 ng/L   EKG 12-lead, tracing only     Status: None (Preliminary result)   Result Value Ref Range    Systolic Blood Pressure  mmHg    Diastolic Blood Pressure  mmHg    Ventricular Rate 53 BPM    Atrial Rate 53 BPM    ME Interval 198 ms    QRS Duration 88 ms     ms    QTc 410 ms    P Axis 75 degrees    R AXIS -42 degrees    T Axis 6  degrees    Interpretation ECG       Sinus bradycardia  Left axis deviation  Abnormal ECG     CBC with platelets differential     Status: Abnormal    Narrative    The following orders were created for panel order CBC with platelets differential.  Procedure                               Abnormality         Status                     ---------                               -----------         ------                     CBC with platelets and d...[[id number]]  Abnormal            Final result                 Please view results for these tests on the individual orders.     Medications   pantoprazole (PROTONIX) IV push injection 40 mg (has no administration in time range)   sodium chloride 0.9% BOLUS 1,000 mL (1,000 mLs Intravenous $New Bag 10/2/24 5366)     Labs Ordered and Resulted from Time of ED Arrival to Time of ED Departure   COMPREHENSIVE METABOLIC PANEL - Abnormal       Result Value    Sodium 140      Potassium 4.8      Carbon Dioxide (CO2) 22      Anion Gap 8      Urea Nitrogen 23.2 (*)     Creatinine 1.86 (*)     GFR Estimate 37 (*)     Calcium 8.5 (*)     Chloride 110 (*)     Glucose 102 (*)     Alkaline Phosphatase 45      AST 18      ALT 12      Protein Total 6.2 (*)     Albumin 3.8      Bilirubin Total 0.3     CBC WITH PLATELETS AND DIFFERENTIAL - Abnormal    WBC Count 4.0      RBC Count 2.68 (*)     Hemoglobin 7.9 (*)     Hematocrit 24.8 (*)     MCV 93      MCH 29.5      MCHC 31.9      RDW 15.9 (*)     Platelet Count 149 (*)     % Neutrophils 70      % Lymphocytes 11      % Monocytes 14      % Eosinophils 4      % Basophils 1      % Immature Granulocytes 1      NRBCs per 100 WBC 0      Absolute Neutrophils 2.8      Absolute Lymphocytes 0.4 (*)     Absolute Monocytes 0.6      Absolute Eosinophils 0.1      Absolute Basophils 0.0      Absolute Immature Granulocytes 0.0      Absolute NRBCs 0.0     TROPONIN T, HIGH SENSITIVITY - Normal    Troponin T, High Sensitivity 22     DIGOXIN LEVEL - Normal    Digoxin 0.9      TROPONIN T, HIGH SENSITIVITY - Normal    Troponin T, High Sensitivity 18     ROUTINE UA WITH MICROSCOPIC REFLEX TO CULTURE   RETICULOCYTE COUNT   IRON AND IRON BINDING CAPACITY   VITAMIN B12   FOLATE   OCCULT BLOOD GASTRIC POCT   LAB BLOOD MORPHOLOGY PATHOLOGIST REVIEW     XR Chest 2 Views   Final Result   Impression:       1.Streaky bilateral basilar predominant pulmonary opacities, may   represent atelectasis/pulmonary edema and/ or infiltrate.   2. Apparent elevation of the left hemidiaphragm      DAISHA LINO MD            SYSTEM ID:  [id number]      CT Head w/o Contrast   Final Result   IMPRESSION:    1. No acute intracranial pathology.    2. Findings suggestive of moderate chronic small vessel ischemic   disease.      I have personally reviewed the examination and initial interpretation   and I agree with the findings.      CATHY GOSS MD            SYSTEM ID:  [id number]             Assessment & Plan    Young Tran is a 79 year old male w/ PMH of Alzheimer's disease, atrial fibrillation on chronic anticoagulation, HLD, BPH  and prostate cancer (s/p radiation 12/2019) who presents to the ED from [facility] after two episodes of dizziness on the am of 10/2 w/ subsequent lowering of himself to the floor; did not hit his head, no LOC. Found to be hypotensive (80s/40s) and bradycardic per EMS, received 500ml IVF en route. Here, he is bradycardic in the low 50s, BP 98/55, otherwise vitally stable. A&Ox1, at reported baseline per [facility] staff. ECG w/ sinus bradycardia and no acute ischemic changes. Broad workup w/ Hb 7.9 down from 11.8 on 7/11/2024, CMP largely unremarkable. Digoxin level therapeutic. Trop 22-> . CT head w/o and CXR unremarkable. Subsequent JIE w/ dried blood around anus, red-tinged stool, positive hemoccult. Discussed with GI; recommended IV PPI BID and admission for monitoring of any further bleeding. Discussed with hospitalist. Received additional 1L fluid bolus and IV pantoprazole 40mg in  the ED.     I have reviewed the nursing notes. I have reviewed the findings, diagnosis, plan and need for follow up with the patient.    New Prescriptions    No medications on file       Final diagnoses:   Gastrointestinal hemorrhage, unspecified gastrointestinal hemorrhage type       Geovany Corona MD  Internal Medicine, PGY-1    --    ED Attending Physician Attestation    I Anu Jackson MD, MD, cared for this patient with the Resident. I have performed a history and physical examination of the patient and discussed management with the resident. I reviewed the resident's documentation above and agree with the documented findings and plan of care.    Summary of HPI, PE, ED Course   Patient is a 79 year old male evaluated in the emergency department for dizziness in the pt with dementia, on eliquis for a fib. Exam and ED course notable for Hgb almost 4 down from 2 months ago, BP ok, Recal hemoccult pos, started PPI and GI consult. After the completion of care in the emergency department, the patient was admitted to inpatient.      Anu Jackson MD, MD  Emergency Medicine    Patient staffed with attending physician Dr. Jackson.   [facility] EMERGENCY DEPARTMENT  10/2/2024     Anu Jackson MD  10/02/24 [id number]

## 2024-10-02 NOTE — H&P
[facility]    History and Physical - Medicine Service, MAROON TEAM 1       Date of Admission:  10/2/2024    Assessment & Plan      Young Tran is a 79 year old male with a history of Alzheimer's, Afib on chronic anticoagulation and recently started digoxin, chronic multifactorial dizziness with orthostatic hypotension, HLD, BPH, prostate cancer s/p radiation in 2019, hx of bleeding internal hemorrhoids, diverticulosis, and colon polyps who presented to the ED for dizziness, found to be bradycardic and hypotensive, with Hgb 7.9 down from 11.8 three months ago, and found to have dried blood around the anus and red-tinged stool on JIE.    Changes today (10/2):  - GI consulted  - CTM Hgb, transfuse if < 7   -consent obtained from daughter  - blood type and screen  - anemia labs pending  - NPO starting at midnight for possible GI intervention  - HOLD PTA eliquis, metoprolol, tamsulosin, and gabapentin    Concern for GI bleed  Normocytic anemia  Hypotension  Patient presented with dizziness, bradycardia and hypotension, also found to have dried blood around the anus and red-tinged stool on JIE in the ED. Also found to have hemoglobin of 7.9, down from 11.8 three months ago. Together, this raises concern for GI bleed.   - GI consult   - continue IV pantoprazole 40 mg BID   - NPO starting at midnight (10/2)   - Strict documentation of rectal output   - CTM CBC and CMP  - Blood type and screen pending  - transfuse RBCs for Hgb < 7  - anemia labs pending  - HOLD PTA eliquis, metoprolol, tamsulosin, and gabapentin    Possible sick sinus syndrome  Has had periods of bradycardia and tachycardia. Outpatient cardiologist mentioned possibility of sick sinus syndrome, so we will plan to involve electrophysiology while here. He has been wearing a zio patch for the last several weeks, so that was sent for analysis.  - EP consult  - Zio patch results pending    Chronic atrial  fibrillation  - HOLD eliquis given concern for GI bleed  - continue PTA digoxin    Depression  Anxiety  - continue PTA sertraline        Diet: NPO per Anesthesia Guidelines for Procedure/Surgery Except for: Meds  Regular Diet Adult    DVT Prophylaxis: Eliquis (HOLD starting 10/2)  Madrigal Catheter: Not present  Fluids: none  Lines: None     Cardiac Monitoring: None  Code Status: No CPR- Pre-arrest intubation OK      Clinically Significant Risk Factors Present on Admission          # Hypocalcemia: Lowest Ca = 8.5 mg/dL in last 2 days, will monitor and replace as appropriate      # Drug Induced Coagulation Defect: home medication list includes an anticoagulant medication      # Dementia: noted on problem list  # Anemia: based on hgb <11  # Anemia: based on hgb <11                  Disposition Plan      Expected Discharge Date: 10/04/2024                The patient's care was discussed with the Attending Physician, Dr. Lopez .    Christian Tracey  Medical Student  Medicine Service, [address]  Securely message with Vocera (more info)  Text page via [facility] Paging/Directory   See signed in provider for up to date coverage information    I, Arleth Rodriguez MD, was present with the medical/KATHERINE student who participated in the service and in the documentation of the note.  I have verified the history and personally performed the physical exam and medical decision making.  I agree with the assessment and plan of care as documented in the note.      ______________________________________________________________________    Chief Complaint   Dizziness    History obtained from Assisted Living Facility Staff.    History of Present Illness   Young Tran is a 79 year old male with a history of Alzheimer's, Afib on chronic anticoagulation and recently started digoxin, chronic multifactorial dizziness with orthostatic hypotension, HLD, BPH, prostate cancer s/p radiation in 2019,  hx of bleeding internal hemorrhoids, diverticulosis, and colon polyps who presented to the ED for dizziness.    [facility] Staff reported that the patient had two episodes of dizziness this morning for which he had to lean against the wall and lower himself to the floor. Did not hit his head and no LOC. He has had a number of similar episodes in the recent past. He otherwise has been in his normal state of health, eating and drinking well, with no recent illness or events.     Past Medical History    Past Medical History:   Diagnosis Date    Alzheimer's disease (H)     Chronic a-fib (H)     Dupuytren contracture     Elevated prostate specific antigen (PSA)     History of colonic polyps     HLD (hyperlipidemia)     Prostate cancer (H)     radiation therapy 12/2019    Tear of lateral meniscus of left knee     Vitamin B12 deficiency (non anemic)     Vitamin D deficiency        Past Surgical History   Past Surgical History:   Procedure Laterality Date    BIOPSY      prostate x4    COLONOSCOPY  2012    COLONOSCOPY  2009    COLONOSCOPY  2007    HERNIA REPAIR Right 1998    POLYPECTOMY      WEDGE RESECTION EYELID Bilateral 3/14/2024    Procedure: Left Lower Lid Wedge Excision of Lesion with Frozen Section Control and Reconstruction;  Surgeon: Suhas Flores MD;  Location:  OR       Prior to Admission Medications   Prior to Admission Medications   Prescriptions Last Dose Informant Patient Reported? Taking?   QUEtiapine (SEROQUEL) 25 MG tablet Past Week at 2000  No Yes   Sig: Take 0.5 tablets (12.5 mg) by mouth daily as needed (aggression/agitation).   apixaban ANTICOAGULANT (ELIQUIS) 5 MG tablet 10/2/2024 at 0800  No Yes   Sig: Take 1 tablet (5 mg) by mouth 2 times daily   cyanocobalamin (VITAMIN B-12) 1000 MCG tablet 10/2/2024 at 0800  No Yes   Sig: Take 1 tablet (1,000 mcg) by mouth daily   digoxin (LANOXIN) 125 MCG tablet 10/2/2024 at 0800  Yes Yes   Sig: Take 125 mcg by mouth daily.   gabapentin (NEURONTIN) 100 MG capsule  "10/2/2024 at 0800  No Yes   Si mg po bid plus 100 mg po daily prn anxiety   Patient taking differently: Take 100 mg by mouth 3 times daily.   metoprolol tartrate (LOPRESSOR) 25 MG tablet 10/2/2024 at 0800  No Yes   Sig: Take 0.5 tablets (12.5 mg) by mouth 2 times daily   sertraline (ZOLOFT) 50 MG tablet 10/2/2024 at 0800  No Yes   Sig: Take 1.5 tablets (75 mg) by mouth daily   simvastatin (ZOCOR) 20 MG tablet 10/1/2024 at   No Yes   Sig: Take 1 tablet (20 mg) by mouth at bedtime   tamsulosin (FLOMAX) 0.4 MG capsule 10/1/2024 at   No Yes   Sig: Take 1 capsule (0.4 mg) by mouth at bedtime      Facility-Administered Medications: None      ------------------------------------------------------------------------     Physical Exam   Vital Signs: Temp: 97.5  F (36.4  C) Temp src: Oral BP: 134/66 Pulse: 55   Resp: 16 SpO2: 97 % O2 Device: None (Room air)    Weight: 0 lbs 0 oz    Constitutional: awake, alert, cooperative, no apparent distress, and appears stated age  Respiratory: No increased work of breathing, good air exchange, clear to auscultation bilaterally, no crackles or wheezing  Cardiovascular: Normal apical impulse, regular rate and rhythm, normal S1 and S2, no S3 or S4, and no murmur noted  GI: No scars, normal bowel sounds, soft, non-distended, non-tender, no masses palpated, no hepatosplenomegally  Neurologic: A&Ox1, oriented only to self, said he was \"at a baseball game\" when asked location, gave [place] with a visual cue.    Medical Decision Making       Please see A&P for additional details of medical decision making.      Data     I have personally reviewed the following data over the past 24 hrs:    4.0  \   7.6 (L)   / 137 (L)     140 110 (H) 23.2 (H) /  102 (H)   4.8 22 1.86 (H) \     ALT: 12 AST: 18 AP: 45 TBILI: 0.3   ALB: 3.8 TOT PROTEIN: 6.2 (L) LIPASE: N/A     Trop: 18 BNP: N/A     TSH: 1.94 T4: N/A A1C: N/A     Ferritin:  16 (L) % Retic:  1.6 LDH:  201       Imaging results reviewed " over the past 24 hrs:   Recent Results (from the past 24 hour(s))   CT Head w/o Contrast    Narrative    EXAM: CT HEAD W/O CONTRAST  10/2/2024 11:30 AM     HISTORY:  dizziness, chronic anticoagulation       COMPARISON:  None    TECHNIQUE: Using multidetector thin collimation helical acquisition  technique, axial, coronal and sagittal CT images from the skull base  to the vertex were obtained without intravenous contrast.   (topogram) image(s) also obtained and reviewed.    FINDINGS:  No acute intracranial hemorrhage, mass effect, or midline shift. No  acute loss of gray-white matter differentiation in the cerebral  hemispheres. Ventricles are proportionate to the cerebral sulci. Clear  basal cisterns. Hypoattenuation of the periventricular white matter  which is nonspecific but favored to represent chronic small vessel  ischemic disease given patient's age. Moderate generalized parenchymal  atrophy.    The bony calvaria and the bones of the skull base are normal. Mucous  retention cysts in the left maxillary sinus. The mastoid air cells are  clear. Grossly normal orbits.       Impression    IMPRESSION:   1. No acute intracranial pathology.   2. Findings suggestive of moderate chronic small vessel ischemic  disease.    I have personally reviewed the examination and initial interpretation  and I agree with the findings.    CATHY GOSS MD         SYSTEM ID:  [id number]   XR Chest 2 Views    Narrative    Exam: XR CHEST 2 VIEWS, 10/2/2024 11:34 AM    Indication: dizzy, weakness    Comparison: None    Findings:     Frontal and lateral projection radiograph of the chest. Apparent  elevation of the left hemidiaphragm. No discernible pneumothorax.  Atherosclerotic calcification of the thoracic aorta. Streaky bilateral  basilar predominant pulmonary opacities      Impression    Impression:     1.Streaky bilateral basilar predominant pulmonary opacities, may  represent atelectasis/pulmonary edema and/ or  infiltrate.  2. Apparent elevation of the left hemidiaphragm    DAISHA LINO MD         SYSTEM ID:  [id number]

## 2024-10-02 NOTE — ED NOTES
Patient was found on the ground at 1615. Nursing assistant was attempting to sit the patient in a wheelchair, he became lightheaded, and his legs gave out. Nursing assistant lowered him to the ground. Patient denies any pain. No bruises or scrapes noted. Hemodynamically stable.

## 2024-10-02 NOTE — PHARMACY-ADMISSION MEDICATION HISTORY
Pharmacist Admission Medication History    Admission medication history is complete. The information provided in this note is only as accurate as the sources available at the time of the update.    Information Source(s): Facility (U/NH/) medication list/MAR via in-person    Pertinent Information: Patient presented with MAR and medications were updated.     Changes made to PTA medication list:  Added: None  Deleted: None  Changed: None    Allergies reviewed with patient and updates made in EHR: no    Medication History Completed By: ANTHONY SEGURA RPH 10/2/2024 3:38 PM    PTA Med List   Medication Sig Last Dose    apixaban ANTICOAGULANT (ELIQUIS) 5 MG tablet Take 1 tablet (5 mg) by mouth 2 times daily 10/2/2024 at 0800    cyanocobalamin (VITAMIN B-12) 1000 MCG tablet Take 1 tablet (1,000 mcg) by mouth daily 10/2/2024 at 0800    digoxin (LANOXIN) 125 MCG tablet Take 125 mcg by mouth daily. 10/2/2024 at 0800    gabapentin (NEURONTIN) 100 MG capsule 100 mg po bid plus 100 mg po daily prn anxiety (Patient taking differently: Take 100 mg by mouth 3 times daily.) 10/2/2024 at 0800    metoprolol tartrate (LOPRESSOR) 25 MG tablet Take 0.5 tablets (12.5 mg) by mouth 2 times daily 10/2/2024 at 0800    QUEtiapine (SEROQUEL) 25 MG tablet Take 0.5 tablets (12.5 mg) by mouth daily as needed (aggression/agitation). Past Week at 2000    sertraline (ZOLOFT) 50 MG tablet Take 1.5 tablets (75 mg) by mouth daily 10/2/2024 at 0800    simvastatin (ZOCOR) 20 MG tablet Take 1 tablet (20 mg) by mouth at bedtime 10/1/2024 at 2000    tamsulosin (FLOMAX) 0.4 MG capsule Take 1 capsule (0.4 mg) by mouth at bedtime 10/1/2024 at 2000

## 2024-10-02 NOTE — LETTER
Transition Communication Hand-off for Care Transitions to Next Level of Care Provider    Name: Young Tran  : 1945  MRN #: [id number]  Primary Care Provider: Juma Love     Primary Clinic: [address] [place] [id number]     Reason for Hospitalization:  Long term (current) use of anticoagulants [Z79.01]  Gastrointestinal hemorrhage, unspecified gastrointestinal hemorrhage type [K92.2]  Admit Date/Time: 10/2/2024 10:24 AM  Discharge Date: 10/4/2024 11:00 AM       Reason for Communication Hand-off Referral: Avoidable readmission within 30 days    Discharge Plan: Memory Care      Needs      Flowsheet Row Most Recent Value   Equipment Currently Used at Home other (see comments)  [did not assess]                Referrals       Future Labs/Procedures    Adult Cardiology Eval  Referral     Process Instructions:    Consider Adult E-Consult to Cardiology for the following conditions: bradycardia, chest pain, heart failure, palpitations, syncope.  E-Consult Cost: 0-$50.    Comments:    Please be aware that coverage of these services is subject to the terms and limitations of your health insurance plan.  Call member services at your health plan with any benefit or coverage questions.  [facility] will call you to coordinate your care as prescribed by your provider. If you don't hear from a representative within 2 business days, please call [phone].                Key Recommendations:  Handoff to PCP    DANA Kim    AVS/Discharge Summary is the source of truth; this is a helpful guide for improved communication of patient story

## 2024-10-02 NOTE — ED TRIAGE NOTES
BIBA per EMS patient from assisted living. A&Ox1 to self. Complaints of dizziness. Bradycardic and Hypotensive en route. 500 mL NS bolus given en route.

## 2024-10-02 NOTE — CONSULTS
Gastroenterology Consultation  GI Luminal Service    Date of Admission:  10/2/2024  Reason for Admission: Dizziness  Date of Consult  10/2/2024   Requesting Physician:  Anu Jackson MD           ASSESSMENT AND RECOMMENDATIONS:   Assessment:  Young Case is a 79 year old male with a history of Alzheimer's disease, dementia with aggressive behavior, depression, anxiety, chronic atrial fibrillation on chronic anticoagulation (Apixaban) and recently started on Digoxin, chronic dizziness thought to be multifactorial with orthostatic hypotension contributing, hyperlipidemia, BPH, prostate cancer treated with radiation 12/2019, vitamin B12 deficiency, basal cell carcinoma status-post left eyelid surgery 3/14/2024, history of bleeding internal hemorrhoids, diverticulosis of the sigmoid colon, colon polyps who presented to the ED on 10/2/2024 from an Assisted Living Center ([facility]) for reported dizziness, found to be bradycardic and hypotensive, A&Ox1. Patient was also found to have normocytic anemia with hemoglobin 7.9 g/dL (10/2/2024) down from 3 months ago at which time it was 11.8 g/dL (7/11/2024). ED performed a digital rectal exam with dried blood around the anus and red-tinged stool, for which the GI Luminal Service was consulted.       # Normocytic Anemia  # Bright Red Blood per Rectum  # History of Internal Hemorrhoids  # Diverticulosis of the Sigmoid Colon  # History of Colon Polyps   # Chronic Atrial Fibrillation on Chronic Anticoagulation (Apixaban).    History limited by Alzheimer's disease with patient oriented only to self.     Per chart review, [facility] for rectal bleeding thought to be due to several excoriated internal hemorrhoids on anoscopy at that time. Most recent colonoscopy in 2020 with [facility] i3 membrane [facility] with internal hemorrhoids, diverticulosis of the sigmoid colon and colon polyps resected with pathology reports 2  tubular adenomas and 2 advanced adenomas.      Scant dried blood around the anus on rectal exam but no stool nor blood in the rectal vault. Differential for bright red blood per rectum includes rectal outlet bleeding (such as hemorrhoidal though not currently), diverticular bleeding, potentially an upper GI source with hypotension on admission.     Normocytic Anemia on admission with noted decline in hemoglobin from 11.8 g/dL on 7/11/2024 to 7.9 g/dL on 10/2/2024. Iron studies are low so likely component of iron deficiency contributing. Reticulocytes inappropriately within normal limits despite anemia, so concern for bone marrow issue. Potentially component of acute GI blood loss given dried blood around anus.     Favor continuing supportive cares and conservative management, including strict documentation of rectal output. Will make patient NPO at midnight pending clinical course and re-evaluation by the GI Luminal Service tomorrow 10/3/2024. If the patient experiences overt GI bleeding with hemodynamic instability, please page the GI Luminal Service (listed on [facility]).        Recommendations:  -- Continue IV Pantoprazole 40 mg BID.   -- NPO at midnight pending re-evaluation by the GI Luminal Service tomorrow.  -- Strict documentation of rectal output.  - Appreciate detailed documentation of stool appearance, including color, consistency, frequency and amount.   - Consider smearing stool thinly on white paper towel to distinguish color.   -- Continue to monitor CBC and CMP.   -- Iron supplementation per primary team.   -- Pending hematologic anemia labs and TSH recheck.   -- Analgesia/Antiemetics per primary team.   -- If the patient experiences overt GI bleeding with hemodynamic instability, please page the GI Luminal Service (listed on [facility]).       COVID status: not tested this admission.     Discussed with Dr. Geovany Corona - Resident with Emergency Department.     Thank you for involving us in this patient's  care. Please do not hesitate to contact the GI service with any questions or concerns.     The patient was discussed and plan agreed upon with Dr. Nathanael Moreno, GI Luminal Service staff physician.    Overall time spent on the date of this encounter preparing to see the patient (including chart review of available notes, clinical status events, imaging and labs); discussing, ordering, coordinating recommended medications, tests or procedures; communicating with other health care professionals; and documenting the above clinical information in the electronic medical record was 70 minutes.    Sabi Gonzalez PA-C  Inpatient Gastroenterology Service  [facility]  Text Page         History of Present Illness:   Patient seen and examined at 1600. History is obtained from patient and chart review.    Young Case is a 79 year old male with a history of Alzheimer's disease, dementia with aggressive behavior, depression, anxiety, chronic atrial fibrillation on chronic anticoagulation (Apixaban) and recently started on Digoxin, chronic dizziness thought to be multifactorial with orthostatic hypotension contributing, hyperlipidemia, BPH, prostate cancer treated with radiation 12/2019, vitamin B12 deficiency, basal cell carcinoma status-post left eyelid surgery 3/14/2024, history of bleeding internal hemorrhoids, diverticulosis of the sigmoid colon, colon polyps who presented to the ED on 10/2/2024 from an Assisted Living Center ([facility]) for reported dizziness, found to be bradycardic and hypotensive, A&Ox1. Patient was also found to have normocytic anemia with hemoglobin 7.9 g/dL (10/2/2024) down from 3 months ago at which time it was 11.8 g/dL (7/11/2024). ED performed a digital rectal exam with dried blood around the anus and red-tinged stool, for which the GI Luminal Service was consulted.       Patient denies GI symptoms. No abdominal pain. Reports stools has been  "regular, brown. Denies blood with stools.     When asked patient's name, reports \"Marc\" when asked last name reports \"Young\" then states First name \"Young,\" Last name \"Marc.\"     Accurately relays YOB: 1945.    Unable to provide location, month, year.     When asked who makes medication decisions, states mother who is around 72 years old. When asked if has a daughter, states, yes daughter \"Sagrario.\"        Previous Procedures:    2/15/2022 - [facility]oscopy with [facility]            9/29/2020 - Colonoscopy - [facility], Dr. Jose Barlow  Findings:   Polyp location: transverse colon.  Quantity: 1.  Size: 15 mm.  Polyp shape:  sessile.         Maneuver: polypectomy was performed with a hot snare and hemoclip x 2.        Removal:  complete.  Retrieval: complete.  Bleeding: moderate.   Polyp location: transverse colon.  Quantity: 1.  Size: 10 mm.  Polyp shape: sessile.         Maneuver: polypectomy was performed with a  hot snare  and hemoclip x 1.        Removal: complete.  Retrieval: complete.  Bleeding: moderate.   Polyp location: transverse colon.  Quantity: 1.  Size: 8 mm.   Polyp shape: sessile.         Maneuver: polypectomy was performed with a cold snare   and hemoclip x 1.        Removal: complete.  Retrieval: complete.  Bleeding: moderate.    Location: transverse colon Quantity: 1.   Size: 3 mm.  Polyp shape: sessile.         Maneuver: polypectomy was performed with a  cold biopsy forceps  .        Removal: complete.  Retrieval: complete.  Bleeding: none.   Diverticulosis.  Location: - sigmoid.      Size:  medium.    Quantity:  several.  No inflammation present.   Anal canal:  internal hemorrhoid(s)   Remainder of the exam is normal.     Impression:   Screening Colonoscopy   Colorectal polyps   Diverticula of colon     Preliminary Plan:   The patient and their physician will receive a copy of the pathology report as well " as pathology-based recommendations for future screening or surveillance.   Recommendation Comments:  Avoid NSAIDs for 2 weeks.   Pathology Results:   A: COLON, TRANSVERSE, POLYPS:           1. Tubular adenomas (2) and sessile serrated adenomas (2, larger two polyps)           2. Negative for high grade dysplasia           3. Per the colonoscopy report:               a. Polyp sizes: 3 mm, 8 mm, 10 mm and 15 mm               b. Resection: Complete               c. Retrieval: Complete       8/18/2017 - Colonoscopy - [facility], Dr. Hi Mcmanus  Findings:   Polyp location: transverse colon.  Quantity: 3.  Size:  3 mm, 5 mm, 8 mm.  Polyp shape:  sessile.         Maneuver: polypectomy was performed with a cold biopsy forceps and hot snare.        Removal:  complete.  Retrieval: complete.  Bleeding: none.   Polyp location: descending colon.  Quantity: 2.  Size:  2-3 mm.  Polyp shape: sessile.         Maneuver: polypectomy was performed with a  cold biopsy forceps .        Removal: complete.  Retrieval: complete.  Bleeding: none.   Modest diverticulosis. The colonic mucosa and vascularity is otherwise unremarkable.   Anal canal:  normal     Impression:   Polyp of colon, unspecified part of colon, unspecified type     Pathology Results:   A: COLON, TRANSVERSE,  POLYPS:   1. Tubular adenomas (3)   2. No evidence of high grade dysplasia   3. Per the attached endoscopy report:     a. Polyp sizes: 3 mm - 8 mm     b. Resection: Complete     c. Retrieval: Complete   B: COLON, DESCENDING, POLYPS:   1. Tubular adenoma (1) and hyperplastic polyp (1)   2. No high grade dysplasia present   3. Per the colonoscopy report:     a. Polyp sizes: 2 mm - 3 mm     b. Resection: Complete     c. Retrieval: Complete       10/31/2012 - Colonoscopy - [facility], Dr. Hernandez Beck                  Past Medical History:   Reviewed and edited as appropriate  Past Medical History:   Diagnosis Date    Alzheimer's  disease (H)     Chronic a-fib (H)     Dupuytren contracture     Elevated prostate specific antigen (PSA)     History of colonic polyps     HLD (hyperlipidemia)     Prostate cancer (H)     radiation therapy 12/2019    Tear of lateral meniscus of left knee     Vitamin B12 deficiency (non anemic)     Vitamin D deficiency             Past Surgical History:   Reviewed and edited as appropriate   Past Surgical History:   Procedure Laterality Date    BIOPSY      prostate x4    COLONOSCOPY  2012    COLONOSCOPY  2009    COLONOSCOPY  2007    HERNIA REPAIR Right 1998    POLYPECTOMY      WEDGE RESECTION EYELID Bilateral 3/14/2024    Procedure: Left Lower Lid Wedge Excision of Lesion with Frozen Section Control and Reconstruction;  Surgeon: Suhas Flores MD;  Location:  OR              Social History:   The patient lives in [place], [place] at an Assisted Living Facility.     Alcohol: Not currently.   Tobacco: Denies.   Illicit drugs: Never.            Family History:   Patient's family history is reviewed today and is non-contributory    Family History   Problem Relation Age of Onset    Prostate Cancer Father     Prostate Cancer Brother              Allergies:   Reviewed and edited as appropriate   No Known Allergies         Medications:     Current Facility-Administered Medications   Medication Dose Route Frequency Provider Last Rate Last Admin    pantoprazole (PROTONIX) IV push injection 40 mg  40 mg Intravenous BID Geovany Corona MD         Current Outpatient Medications   Medication Sig Dispense Refill    apixaban ANTICOAGULANT (ELIQUIS) 5 MG tablet Take 1 tablet (5 mg) by mouth 2 times daily 60 tablet 11    cyanocobalamin (VITAMIN B-12) 1000 MCG tablet Take 1 tablet (1,000 mcg) by mouth daily 30 tablet 11    digoxin (LANOXIN) 125 MCG tablet Take 125 mcg by mouth daily.      gabapentin (NEURONTIN) 100 MG capsule 100 mg po bid plus 100 mg po daily prn anxiety (Patient taking differently: Take 100 mg by mouth 3 times  daily.) 60 capsule 11    metoprolol tartrate (LOPRESSOR) 25 MG tablet Take 0.5 tablets (12.5 mg) by mouth 2 times daily 30 tablet 11    QUEtiapine (SEROQUEL) 25 MG tablet Take 0.5 tablets (12.5 mg) by mouth daily as needed (aggression/agitation). 15 tablet 11    sertraline (ZOLOFT) 50 MG tablet Take 1.5 tablets (75 mg) by mouth daily 60 tablet 11    simvastatin (ZOCOR) 20 MG tablet Take 1 tablet (20 mg) by mouth at bedtime 30 tablet 11    tamsulosin (FLOMAX) 0.4 MG capsule Take 1 capsule (0.4 mg) by mouth at bedtime 30 capsule 11             Review of Systems:     A complete review of systems was performed and is negative except as noted in the HPI           Physical Exam:   Temp: 97.8  F (36.6  C) Temp src: Oral BP: 98/55 Pulse: 71   Resp: 16 SpO2: 99 % O2 Device: None (Room air)    Wt:   Wt Readings from Last 2 Encounters:   09/26/24 85 kg (187 lb 6.4 oz)   08/28/24 85.6 kg (188 lb 12.8 oz)        General: 79 year old male lying in bed in NAD. Appears comfortable.  Answers appropriately.    HEENT: Head is AT/NC. Sclera anicteric. +eye glasses.  Lungs: No increased work of breathing, speaking in full sentences, equal chest rise. On Room Air.   Heart: Regular rate. Peripheral perfusion intact.  Abdomen: Soft, non-tender, non-distended.  BS +. No peritoneal signs.  Rectal: Medical Student Clinton Mi present in the room as chaperone. External exam with no fissures, lesions or masses visualized; scant dried maroon blood around the anus. JIE with no stool in the rectal vault, no blood.  Extremities: WWP.  Musculoskeletal: No gross deformity.  Skin: No jaundice or rash on exposed skin.  Neurologic: Grossly non-focal.  CN 2-12 grossly intact.   Mental status/Psych: A&O x1 (self though initially provided last name as first and vice versa; accurately relays date of birth). Pleasant, interactive. +Confabulating.            Data:   LAB WORK:    [facility]  Recent Labs   Lab 10/02/24  1040      POTASSIUM 4.8   CHLORIDE  110*   SUNIL 8.5*   CO2 22   BUN 23.2*   CR 1.86*   *     CBC  Recent Labs   Lab 10/02/24  1040   WBC 4.0   RBC 2.68*   HGB 7.9*   HCT 24.8*   MCV 93   MCH 29.5   MCHC 31.9   RDW 15.9*   *     INRNo lab results found in last 7 days.  LFTs  Recent Labs   Lab 10/02/24  1040   ALKPHOS 45   AST 18   ALT 12   BILITOTAL 0.3   PROTTOTAL 6.2*   ALBUMIN 3.8      PANCNo lab results found in last 7 days.      IMAGING:    XR CHEST 2 VIEWS, 10/2/2024 11:34 AM  Indication: dizzy, weakness     Comparison: None     Findings:      Frontal and lateral projection radiograph of the chest. Apparent  elevation of the left hemidiaphragm. No discernible pneumothorax.  Atherosclerotic calcification of the thoracic aorta. Streaky bilateral  basilar predominant pulmonary opacities                                                                   Impression:   1.Streaky bilateral basilar predominant pulmonary opacities, may  represent atelectasis/pulmonary edema and/ or infiltrate.  2. Apparent elevation of the left hemidiaphragm      CT HEAD W/O CONTRAST  10/2/2024 11:30 AM   HISTORY:  dizziness, chronic anticoagulation        COMPARISON:  None     TECHNIQUE: Using multidetector thin collimation helical acquisition  technique, axial, coronal and sagittal CT images from the skull base  to the vertex were obtained without intravenous contrast.   (topogram) image(s) also obtained and reviewed.     FINDINGS:  No acute intracranial hemorrhage, mass effect, or midline shift. No  acute loss of gray-white matter differentiation in the cerebral  hemispheres. Ventricles are proportionate to the cerebral sulci. Clear  basal cisterns. Hypoattenuation of the periventricular white matter  which is nonspecific but favored to represent chronic small vessel  ischemic disease given patient's age. Moderate generalized parenchymal  atrophy.     The bony calvaria and the bones of the skull base are normal. Mucous  retention cysts in the left  maxillary sinus. The mastoid air cells are  clear. Grossly normal orbits.                                                                    IMPRESSION:   1. No acute intracranial pathology.   2. Findings suggestive of moderate chronic small vessel ischemic  disease.      =======================================================================

## 2024-10-02 NOTE — LETTER
Recipient: Chela DUNN          Sender: ED/Obs @ [phone]          Date: October 4, 2024  Patient Name:  Young Tran  Patient YOB: 1945  Routing Message:  Orders        The documents accompanying this notice contain confidential information belonging to the sender.  This information is intended only for the use of the individual or entity named above.  The authorized recipient of this information is prohibited from disclosing this information to any other party and is required to destroy the information after its stated need has been fulfilled, unless otherwise required by state law.    If you are not the intended recipient, you are hereby notified that any disclosure, copy, distribution or action taken in reliance on the contents of these documents is strictly prohibited.  If you have received this document in error, please return it by fax to [phone] with a note on the cover sheet explaining why you are returning it (e.g. not your patient, not your provider, etc.).  If you need further assistance, please call .  Documents may also be returned by mail to [employer], , [address], [place], [place] [address].

## 2024-10-02 NOTE — PLAN OF CARE
Goal Outcome Evaluation:    A&Ox1 to self. Bradycardic. C/O dizziness. Weak and unsteady when up. Still need UA. Up with 2 assist and a gait belt. Bed alarm on. Denies pain. Regular diet. L PIV SL'd. NPO at midnight for possible GI intervention. No BM this shift

## 2024-10-03 LAB
ALBUMIN SERPL BCG-MCNC: 4.1 G/DL (ref 3.5–5.2)
ALBUMIN UR-MCNC: 10 MG/DL
ALP SERPL-CCNC: 59 U/L (ref 40–150)
ALT SERPL W P-5'-P-CCNC: 23 U/L (ref 0–70)
ANION GAP SERPL CALCULATED.3IONS-SCNC: 10 MMOL/L (ref 7–15)
APPEARANCE UR: CLEAR
AST SERPL W P-5'-P-CCNC: 25 U/L (ref 0–45)
BILIRUB SERPL-MCNC: 0.4 MG/DL
BILIRUB UR QL STRIP: NEGATIVE
BUN SERPL-MCNC: 24.9 MG/DL (ref 8–23)
CALCIUM SERPL-MCNC: 8.8 MG/DL (ref 8.8–10.4)
CHLORIDE SERPL-SCNC: 109 MMOL/L (ref 98–107)
COLOR UR AUTO: ABNORMAL
CREAT SERPL-MCNC: 1.59 MG/DL (ref 0.67–1.17)
EGFRCR SERPLBLD CKD-EPI 2021: 44 ML/MIN/1.73M2
ERYTHROCYTE [DISTWIDTH] IN BLOOD BY AUTOMATED COUNT: 16 % (ref 10–15)
GLUCOSE SERPL-MCNC: 106 MG/DL (ref 70–99)
GLUCOSE UR STRIP-MCNC: NEGATIVE MG/DL
HCO3 SERPL-SCNC: 20 MMOL/L (ref 22–29)
HCT VFR BLD AUTO: 26.6 % (ref 40–53)
HGB BLD-MCNC: 8.3 G/DL (ref 13.3–17.7)
HGB UR QL STRIP: NEGATIVE
HYALINE CASTS: 9 /LPF
KETONES UR STRIP-MCNC: NEGATIVE MG/DL
LEUKOCYTE ESTERASE UR QL STRIP: NEGATIVE
MCH RBC QN AUTO: 28.8 PG (ref 26.5–33)
MCHC RBC AUTO-ENTMCNC: 31.2 G/DL (ref 31.5–36.5)
MCV RBC AUTO: 92 FL (ref 78–100)
MUCOUS THREADS #/AREA URNS LPF: PRESENT /LPF
NITRATE UR QL: NEGATIVE
PATH REPORT.COMMENTS IMP SPEC: ABNORMAL
PATH REPORT.COMMENTS IMP SPEC: YES
PATH REPORT.FINAL DX SPEC: ABNORMAL
PATH REPORT.MICROSCOPIC SPEC OTHER STN: ABNORMAL
PATH REPORT.MICROSCOPIC SPEC OTHER STN: ABNORMAL
PATH REPORT.RELEVANT HX SPEC: ABNORMAL
PH UR STRIP: 5.5 [PH] (ref 5–7)
PLATELET # BLD AUTO: 164 10E3/UL (ref 150–450)
POTASSIUM SERPL-SCNC: 4.4 MMOL/L (ref 3.4–5.3)
PROT SERPL-MCNC: 6.7 G/DL (ref 6.4–8.3)
RBC # BLD AUTO: 2.88 10E6/UL (ref 4.4–5.9)
RBC URINE: <1 /HPF
SODIUM SERPL-SCNC: 139 MMOL/L (ref 135–145)
SP GR UR STRIP: 1.02 (ref 1–1.03)
UROBILINOGEN UR STRIP-MCNC: NORMAL MG/DL
WBC # BLD AUTO: 7.5 10E3/UL (ref 4–11)
WBC URINE: <1 /HPF

## 2024-10-03 PROCEDURE — 85014 HEMATOCRIT: CPT

## 2024-10-03 PROCEDURE — 99223 1ST HOSP IP/OBS HIGH 75: CPT | Mod: FS | Performed by: INTERNAL MEDICINE

## 2024-10-03 PROCEDURE — 120N000002 HC R&B MED SURG/OB UMMC

## 2024-10-03 PROCEDURE — 250N000013 HC RX MED GY IP 250 OP 250 PS 637

## 2024-10-03 PROCEDURE — 99232 SBSQ HOSP IP/OBS MODERATE 35: CPT | Mod: GC | Performed by: STUDENT IN AN ORGANIZED HEALTH CARE EDUCATION/TRAINING PROGRAM

## 2024-10-03 PROCEDURE — 81001 URINALYSIS AUTO W/SCOPE: CPT

## 2024-10-03 PROCEDURE — 250N000009 HC RX 250

## 2024-10-03 PROCEDURE — 80053 COMPREHEN METABOLIC PANEL: CPT

## 2024-10-03 PROCEDURE — 36415 COLL VENOUS BLD VENIPUNCTURE: CPT

## 2024-10-03 PROCEDURE — 99232 SBSQ HOSP IP/OBS MODERATE 35: CPT

## 2024-10-03 RX ORDER — OLANZAPINE 10 MG/2ML
2.5 INJECTION, POWDER, FOR SOLUTION INTRAMUSCULAR
Status: COMPLETED | OUTPATIENT
Start: 2024-10-03 | End: 2024-10-04

## 2024-10-03 RX ADMIN — Medication 12.5 MG: at 18:01

## 2024-10-03 RX ADMIN — PANTOPRAZOLE SODIUM 40 MG: 40 INJECTION, POWDER, FOR SOLUTION INTRAVENOUS at 20:08

## 2024-10-03 RX ADMIN — CYANOCOBALAMIN TAB 500 MCG 1000 MCG: 500 TAB at 08:34

## 2024-10-03 RX ADMIN — PANTOPRAZOLE SODIUM 40 MG: 40 INJECTION, POWDER, FOR SOLUTION INTRAVENOUS at 08:34

## 2024-10-03 RX ADMIN — DIGOXIN 125 MCG: 125 TABLET ORAL at 11:12

## 2024-10-03 RX ADMIN — SERTRALINE HYDROCHLORIDE 75 MG: 50 TABLET ORAL at 08:34

## 2024-10-03 ASSESSMENT — ACTIVITIES OF DAILY LIVING (ADL)
ADLS_ACUITY_SCORE: 41
DEPENDENT_IADLS:: CLEANING;COOKING;LAUNDRY;SHOPPING;MEDICATION MANAGEMENT;MONEY MANAGEMENT;TRANSPORTATION
ADLS_ACUITY_SCORE: 51
ADLS_ACUITY_SCORE: 41
ADLS_ACUITY_SCORE: 51
ADLS_ACUITY_SCORE: 51
ADLS_ACUITY_SCORE: 41
ADLS_ACUITY_SCORE: 41

## 2024-10-03 NOTE — TELEPHONE ENCOUNTER
Central Prior Authorization Team   Phone: [phone]    PA Initiation    Medication: QUEtiapine (SEROQUEL) 25 MG tablet  Insurance Company: [employer] - Phone [phone] Fax [phone]  Pharmacy Filling the Rx: [facility] - [address]  Filling Pharmacy Phone: [phone]  Filling Pharmacy Fax:    Start Date: 10/3/2024

## 2024-10-03 NOTE — PROGRESS NOTES
[facility]    Progress Note - Medicine Service, LINDA TEAM 1       Date of Admission:  10/2/2024    Assessment & Plan   Young Tran is a 79 year old male with a history of Alzheimer's, Afib on chronic anticoagulation and recently started digoxin, chronic multifactorial dizziness with orthostatic hypotension, HLD, BPH, prostate cancer s/p radiation in 2019, hx of bleeding internal hemorrhoids, diverticulosis, and colon polyps who presented to the ED for dizziness, found to be bradycardic and hypotensive, with Hgb 7.9 down from 11.8 three months ago, and found to have dried blood around the anus and red-tinged stool on JIE concerning for GI bleed.     Changes today (10/3):  - GI consulted: no procedure today given stable Hgb  - CTM Hgb  - resume full diet given no GI procedure today  - iron supplementation for anemia  - EP consult today; stop digoxin on discharge, continue on metoprolol. Given risk of GI bleed/ falls and that patient is in sinus currently, can consider holding AC and following up outpatient for possible watchman.  -Regular diet  -Per discussion w/ Peg (daughter) she felt that given the risks that she would elect to stop her father's AC. She would like to hear what Cardiology's thoughts were, but would be ok with him returning to his facility without being on AC given the discussion of Risks vs Benefits     Concern for GI bleed, improving  Normocytic anemia  Hypotension  Patient presented with dizziness, bradycardia and hypotension, also found to have dried blood around the anus and red-tinged stool on JIE in the ED. Also found to have hemoglobin of 7.9, down from 11.8 three months ago. Together, this raises concern for GI bleed. Blood type and screen complete, AB+.   - GI consult              - continue IV pantoprazole 40 mg BID              - no procedure today (10/3) given stable Hgb              - Strict documentation of rectal output               - CTM CBC and CMP  - trend Hgb, next tomorrow AM  - transfuse RBCs for Hgb < 7  - If no changes in Hgb will change IV PPI to oral PPI  - HOLD PTA eliquis, metoprolol, tamsulosin, and gabapentin  -Per discussion w/ Peg (daughter) she felt that given the risks that she would elect to stop her father's AC. She would like to hear what Cardiology's thoughts were, but would be ok with him returning to his facility without being on AC given the discussion of Risks vs Benefits  -Per EP: Given risk of GI bleed/ falls and that patient is in sinus currently, can consider holding AC and following up outpatient for possible watchman.     Possible sick sinus syndrome  Has had periods of bradycardia and tachycardia. Outpatient cardiologist mentioned possibility of sick sinus syndrome, so we will plan to involve electrophysiology while here. He has been wearing a zio patch for the last several weeks, so that was sent for analysis.  - EP consult today  - Zio patch showed good response with no concerns of sick sinus syndrome. Rates were between 89-90 with no signs of negin.   -Stop digoxin on discharge  -Can continue metoprolol      Chronic atrial fibrillation  - HOLD eliquis given concern for GI bleed  - see above     Depression  Anxiety  - continue PTA sertraline     Alzheimers  Aggression   - PTA quetiapine   - 1:1 sitter to help with reorienting patient           Diet: Regular Diet Adult    DVT Prophylaxis: DOAC (HOLD given concern for bleed)  Madrigal Catheter: Not present  Fluids: none  Lines: None     Cardiac Monitoring: None  Code Status: No CPR- Pre-arrest intubation OK      Clinically Significant Risk Factors Present on Admission          # Hypocalcemia: Lowest Ca = 8.5 mg/dL in last 2 days, will monitor and replace as appropriate      # Drug Induced Coagulation Defect: home medication list includes an anticoagulant medication      # Dementia: noted on problem list  # Anemia: based on hgb <11  # Anemia: based on hgb <11                   Disposition Plan     Expected Discharge Date: 10/04/2024                The patient's care was discussed with the Attending Physician, Dr. Lopez .    Christian Tracey  Medical Student  Medicine Service, [facility] TEAM 1  [facility]  Securely message with Vocera (more info)  Text page via [facility] Paging/Directory   See signed in provider for up to date coverage information  I, Arleth Rodriguez MD, was present with the medical/KATHERINE student who participated in the service and in the documentation of the note.  I have verified the history and personally performed the physical exam and medical decision making.  I agree with the assessment and plan of care as documented in the note.      ______________________________________________________________________    Interval History   Bill does not have any acute concerns today. He denies abdominal pain, dizziness, headaches, chest pain, SOB, vision changes. Overnight he repeatedly tried getting out of bed and leaving, setting off the bed alarm 8-10 times. He now has a sitter with him for monitoring during the day.    Physical Exam   Vital Signs: Temp: 98.1  F (36.7  C) Temp src: Oral BP: 124/73 Pulse: 57   Resp: 16 SpO2: 96 % O2 Device: None (Room air)    Weight: 0 lbs 0 oz    Constitutional: awake, alert, cooperative, no apparent distress, and appears stated age   Cardio: Normal apical impulse, regular rate and rhythm, normal S1 and S2, no S3 or S4, and no murmur noted   Pulmonary: No increased work of breathing, good air exchange, clear to auscultation bilaterally, no crackles or wheezing   Abdominal: No scars, normal bowel sounds, soft, non-distended, non-tender, no masses palpated, no hepatosplenomegaly  Neuro: A&Ox1, oriented only to self     Medical Decision Making       Please see A&P for additional details of medical decision making.      Data     I have personally reviewed the following data over the past 24 hrs:    7.5  \    8.3 (L)   / 164     139 109 (H) 24.9 (H) /  106 (H)   4.4 20 (L) 1.59 (H) \     ALT: 23 AST: 25 AP: 59 TBILI: 0.4   ALB: 4.1 TOT PROTEIN: 6.7 LIPASE: N/A     Trop: 18 BNP: N/A     TSH: 1.94 T4: N/A A1C: N/A     Ferritin:  16 (L) % Retic:  1.6 LDH:  201       Imaging results reviewed over the past 24 hrs:   No results found for this or any previous visit (from the past 24 hour(s)).

## 2024-10-03 NOTE — PLAN OF CARE
Goal Outcome Evaluation:    Reviewed with: patient, friend     Overall Patient Progress: no change    Outcome Evaluation: Plan is to return home ([facility])

## 2024-10-03 NOTE — PROGRESS NOTES
GASTROENTEROLOGY PROGRESS NOTE  GI Luminal Service    Date of Admission: 10/2/2024  Reason for Admission:  Acute on Chronic Dizziness, Acute on Chronic Normocytic Anemia.       ASSESSMENT:  Young Lizama is a 79 year old male with a history of Alzheimer's disease, dementia with aggressive behavior, depression, anxiety, chronic atrial fibrillation on chronic anticoagulation (Apixaban) and recently started on Digoxin, chronic dizziness thought to be multifactorial with orthostatic hypotension contributing +/- potential sick sinus syndrome, hyperlipidemia, BPH, prostate cancer treated with radiation 12/2019, vitamin B12 deficiency, basal cell carcinoma status-post left eyelid surgery 3/14/2024, history of bleeding internal hemorrhoids, diverticulosis of the sigmoid colon, colon polyps who presented to the ED on 10/2/2024 from an [facility] for reported dizziness, found to be bradycardic and hypotensive, A&Ox1. Patient was also found to have normocytic anemia with hemoglobin 7.9 g/dL (10/2/2024) down from 3 months ago at which time it was 11.8 g/dL (7/11/2024). ED performed a digital rectal exam with dried blood around the anus and red-tinged stool, for which the GI Luminal Service was consulted.         # Bright Red Blood per Rectum, resolved  # Acute on Chronic Normocytic Anemia  # History of Internal Hemorrhoids  # Diverticulosis of the Sigmoid Colon  # History of Colon Polyps   # Chronic Atrial Fibrillation on Chronic Anticoagulation (Apixaban)     History limited by Alzheimer's disease with patient oriented only to self.      Per chart review, [facility] for rectal bleeding thought to be due to several excoriated internal hemorrhoids on anoscopy at that time. Most recent colonoscopy in 2020 with [facility] with internal hemorrhoids, diverticulosis of the sigmoid colon and colon polyps resected with pathology  reports 2 tubular adenomas and 2 advanced adenomas.       Scant dried blood around the anus on rectal exam but no stool nor blood in the rectal vault 10/2. Differential for bright red blood per rectum includes rectal outlet bleeding (such as hemorrhoidal though not currently), diverticular bleeding, potentially an upper GI source with hypotension on admission.      Normocytic Anemia on admission with noted decline in hemoglobin from 11.8 g/dL on 7/11/2024 to 7.9 g/dL on 10/2/2024. Iron studies and Ferritin are low so likely component of iron deficiency contributing to acute on chronic anemia. Reticulocytes inappropriately within normal limits despite anemia, so concern for bone marrow issue. Potentially component of acute versus chronic GI blood loss given dried blood around anus.    10/3 Interval Update: Hemoglobin stable from admission at 8.3 g/dL 10/3, and patient remains hemodynamically stable with no bowel movement since admission. The GI Luminal Endoscopic work-up of anemia is bidirectional GI endoscopy (EGD and Colonoscopy). Discussed patient's clinical course with patient's daughter Peg Hernandez who is the patient's POA. Peg favors continuing supportive cares and conservative management and avoiding invasive procedures including GI endoscopies, which is reasonable in the absence of hemodynamically significant overt GI bleeding. Continue supportive cares and conservative management, including strict documentation of rectal output and PPI 40 mg BID.         Recommendations:  -- Appreciate ongoing Goals of Care conversations between primary team and patient's daughter/POA Peg Hernandez.   -- Continue Pantoprazole 40 mg BID.   -- Iron supplementation per primary team for iron deficiency.   -- Diet per primary team.   -- Strict documentation of rectal output.  - Appreciate detailed documentation of stool appearance, including color, consistency, frequency and amount.   - Consider smearing stool thinly on  white paper towel to distinguish color.   -- Continue to monitor CBC and CMP.   -- Primary team to follow hematologic anemia work-up results.   - Appreciate ongoing hematologic anemia work-up per primary team.   -- Consider starting Miralax 1-2 capfuls daily, titrated to promote 1-2 soft, continent, easy to evacuate bowel movements daily (minimum of 3 bowel movements per week) given no bowel movement since admission.   -- Analgesia/Antiemetics per primary team.   -- If the patient experiences overt GI bleeding with hemodynamic instability, please page the GI Luminal Service (listed on [facility]).       OUTPATIENT:   -- No outpatient GI follow-up necessary.       COVID status: not tested this admission.     [facility]     Thank you for involving us in this patient's care. Please do not hesitate to contact the GI service with any questions or concerns.     The patient was discussed and plan agreed upon with Dr. Nathanael Moreno, GI Luminal Service staff physician.    Overall time spent on the date of this encounter preparing to see the patient (including chart review of available notes, clinical status events, imaging and labs); obtaining and/or reviewing separately obtained history from patient's daughter Peg Hernandez; discussing, ordering, coordinating recommended medications, tests or procedures; communicating with other health care professionals; and documenting the above clinical information in the electronic medical record was 40 minutes.    Sabi Gonzalez PA-C  Inpatient Gastroenterology Service  [facility]  Text Page  _______________________________________________________________      Subjective: Nursing notes and 24hr events reviewed.     Patient seen and examined at 1200. Patient reports doing good. Enjoying lunch and thankful for the vanilla ice cream.     Denies nausea, vomiting, acid reflux, heartburn, abdominal pain.     No bowel  movement since admission.     Discussed with patient that a phone call was made to daughter Peg and discussion as above. Patient is in agreement with plan.       ROS:   4 pt ROS negative unless noted in subjective.     Objective:  Blood pressure 124/73, pulse 57, temperature 98.1  F (36.7  C), temperature source Oral, resp. rate 16, SpO2 96%.      General: 79 year old male lying in bed with the head of bed elevated in NAD eating lunch. Appears comfortable.  Answers appropriately.    HEENT: Head is AT/NC. Sclera anicteric. +eye glasses.  Lungs: No increased work of breathing, speaking in full sentences, equal chest rise. On Room Air.   Heart: Regular rate. Peripheral perfusion intact.  Abdomen: Soft, non-tender, non-distended. No peritoneal signs.  Extremities: WWP.  Musculoskeletal: No gross deformity.  Skin: No jaundice or rash on exposed skin.  Neurologic: Grossly non-focal.  CN 2-12 grossly intact.   Mental status/Psych: A&O x1 (self though initially provided last name as first and vice versa; accurately relays date of birth). Pleasant, interactive. +Confabulating.       Previous Procedures:     2/15/2022 - [facility] with [facility]              9/29/2020 - Colonoscopy - [facility], Dr. Jose Barlow  Findings:   Polyp location: transverse colon.  Quantity: 1.  Size: 15 mm.  Polyp shape:  sessile.         Maneuver: polypectomy was performed with a hot snare and hemoclip x 2.        Removal:  complete.  Retrieval: complete.  Bleeding: moderate.   Polyp location: transverse colon.  Quantity: 1.  Size: 10 mm.  Polyp shape: sessile.         Maneuver: polypectomy was performed with a  hot snare  and hemoclip x 1.        Removal: complete.  Retrieval: complete.  Bleeding: moderate.   Polyp location: transverse colon.  Quantity: 1.  Size: 8 mm.   Polyp shape: sessile.         Maneuver: polypectomy was performed with a cold snare   and hemoclip x  1.        Removal: complete.  Retrieval: complete.  Bleeding: moderate.    Location: transverse colon Quantity: 1.   Size: 3 mm.  Polyp shape: sessile.         Maneuver: polypectomy was performed with a  cold biopsy forceps  .        Removal: complete.  Retrieval: complete.  Bleeding: none.   Diverticulosis.  Location: - sigmoid.      Size:  medium.    Quantity:  several.  No inflammation present.   Anal canal:  internal hemorrhoid(s)   Remainder of the exam is normal.     Impression:   Screening Colonoscopy   Colorectal polyps   Diverticula of colon     Preliminary Plan:   The patient and their physician will receive a copy of the pathology report as well as pathology-based recommendations for future screening or surveillance.   Recommendation Comments:  Avoid NSAIDs for 2 weeks.   Pathology Results:   A: COLON, TRANSVERSE, POLYPS:           1. Tubular adenomas (2) and sessile serrated adenomas (2, larger two polyps)           2. Negative for high grade dysplasia           3. Per the colonoscopy report:               a. Polyp sizes: 3 mm, 8 mm, 10 mm and 15 mm               b. Resection: Complete               c. Retrieval: Complete         8/18/2017 - Colonoscopy - [facility], Dr. Hi Mcmanus  Findings:   Polyp location: transverse colon.  Quantity: 3.  Size:  3 mm, 5 mm, 8 mm.  Polyp shape:  sessile.         Maneuver: polypectomy was performed with a cold biopsy forceps and hot snare.        Removal:  complete.  Retrieval: complete.  Bleeding: none.   Polyp location: descending colon.  Quantity: 2.  Size:  2-3 mm.  Polyp shape: sessile.         Maneuver: polypectomy was performed with a  cold biopsy forceps .        Removal: complete.  Retrieval: complete.  Bleeding: none.   Modest diverticulosis. The colonic mucosa and vascularity is otherwise unremarkable.   Anal canal:  normal     Impression:   Polyp of colon, unspecified part of colon, unspecified type     Pathology Results:   A:  COLON, TRANSVERSE,  POLYPS:   1. Tubular adenomas (3)   2. No evidence of high grade dysplasia   3. Per the attached endoscopy report:     a. Polyp sizes: 3 mm - 8 mm     b. Resection: Complete     c. Retrieval: Complete   B: COLON, DESCENDING, POLYPS:   1. Tubular adenoma (1) and hyperplastic polyp (1)   2. No high grade dysplasia present   3. Per the colonoscopy report:     a. Polyp sizes: 2 mm - 3 mm     b. Resection: Complete     c. Retrieval: Complete         10/31/2012 - Colonoscopy - [facility], Dr. Hernandez Beck             LABS:  BMP  Recent Labs   Lab 10/03/24  0610 10/02/24  1040    140   POTASSIUM 4.4 4.8   CHLORIDE 109* 110*   SUNIL 8.8 8.5*   CO2 20* 22   BUN 24.9* 23.2*   CR 1.59* 1.86*   * 102*     CBC  Recent Labs   Lab 10/03/24  0610 10/02/24  2129 10/02/24  1503 10/02/24  1040   WBC 7.5  --  4.0 4.0   RBC 2.88*  --  2.58* 2.68*   HGB 8.3*   < > 7.6* 7.9*   HCT 26.6*  --  24.0* 24.8*   MCV 92  --  93 93   MCH 28.8  --  29.5 29.5   MCHC 31.2*  --  31.7 31.9   RDW 16.0*  --  15.9* 15.9*     --  137* 149*    < > = values in this interval not displayed.     INRNo lab results found in last 7 days.  LFTs  Recent Labs   Lab 10/03/24  0610 10/02/24  1040   ALKPHOS 59 45   AST 25 18   ALT 23 12   BILITOTAL 0.4 0.3   PROTTOTAL 6.7 6.2*   ALBUMIN 4.1 3.8      PANCNo lab results found in last 7 days.      IMAGING:     XR CHEST 2 VIEWS, 10/2/2024 11:34 AM  Indication: dizzy, weakness     Comparison: None     Findings:      Frontal and lateral projection radiograph of the chest. Apparent  elevation of the left hemidiaphragm. No discernible pneumothorax.  Atherosclerotic calcification of the thoracic aorta. Streaky bilateral  basilar predominant pulmonary opacities                                                                   Impression:   1.Streaky bilateral basilar predominant pulmonary opacities, may  represent atelectasis/pulmonary edema and/ or infiltrate.  2. Apparent elevation  of the left hemidiaphragm        CT HEAD W/O CONTRAST  10/2/2024 11:30 AM   HISTORY:  dizziness, chronic anticoagulation        COMPARISON:  None     TECHNIQUE: Using multidetector thin collimation helical acquisition  technique, axial, coronal and sagittal CT images from the skull base  to the vertex were obtained without intravenous contrast.   (topogram) image(s) also obtained and reviewed.     FINDINGS:  No acute intracranial hemorrhage, mass effect, or midline shift. No  acute loss of gray-white matter differentiation in the cerebral  hemispheres. Ventricles are proportionate to the cerebral sulci. Clear  basal cisterns. Hypoattenuation of the periventricular white matter  which is nonspecific but favored to represent chronic small vessel  ischemic disease given patient's age. Moderate generalized parenchymal  atrophy.     The bony calvaria and the bones of the skull base are normal. Mucous  retention cysts in the left maxillary sinus. The mastoid air cells are  clear. Grossly normal orbits.                                                                    IMPRESSION:   1. No acute intracranial pathology.   2. Findings suggestive of moderate chronic small vessel ischemic  disease.

## 2024-10-03 NOTE — PROVIDER NOTIFICATION
"\"Hi, I'm thinking that for day shift pt in ED 33 should have a sitter. Overnight he's set the bed alarm off 8-10 times by sitting on the edge of the bed and pulling at his pulse ox and tele wires while also talking about leaving. I've been able to reorient him each time. This most recent time he set the bed alarm off, he was using his feet to grab his shoes and he was not doing it in a safe way. For night shift this behavior has been manageable since we had extra nurses and were able to monitor him, but day shift may be more challenging. I just wanted to alert you of the behaviors and see if we can get the ball rolling on putting in orders for a sitter. Or should I wait and just tell the next nurse after me? Let me know if you need more information.\"    \" Pt is only oriented to person and is confused. \"  "

## 2024-10-03 NOTE — CONSULTS
Care Management Initial Consult    General Information  Assessment completed with: Patient, Friend   Type of CM/SW Visit: Initial Assessment  Primary Care Provider verified and updated as needed: Yes - Inderjit Love with [facility]  Readmission within the last 30 days: no previous admission in last 30 days   Reason for Consult: discharge planning  Advance Care Planning: Advance Care Planning Reviewed: no concerns identified        Communication Assessment  Patient's communication style: spoken language (English)        Cognitive  Cognitive/Neuro/Behavioral: WDL except orientation    Level of Consciousness: alert, confused    Arousal Level: opens eyes spontaneously, arouses to touch/gentle shaking, arouses to voice  Orientation: disoriented to place, time, situation  Mood/Behavior: calm, restless    Best Language: 0 - No aphasia    Speech: illogical, clear    Living Environment:   People in home: facility resident     Current living Arrangements: assisted living, other (see comments) (memory care)    Name of Facility: [facility]   Able to return to prior arrangements: yes      Family/Social Support:  Care provided by: other (see comments) (facility staff)  Provides care for: no one  Marital Status: Single - he did have a partner, Sagrario, who passed away earlier this year  Support system: One child - Peg - who lives in [place], friends, Sagrario's nieces          Description of Support System: Involved, Supportive    Support Assessment: Adequate family and caregiver support, Adequate social supports    Current Resources:   Patient receiving home care services: No  Community Resources: Other (see comment) (memory care)  Equipment currently used at home: other (see comments) (did not assess)  Supplies currently used at home: Other (did not assess)    Employment/Financial:  Employment Status: retired     Employment Comments: worked in the [employer] industry  Financial Concerns: none   Referral to  Financial Worker: No  Does the patient's insurance plan have a 3 day qualifying hospital stay waiver?  Yes     Which insurance plan 3 day waiver is available? Alternative insurance waiver    Will the waiver be used for post-acute placement? No    Lifestyle & Psychosocial Needs:  Social Determinants of Health     Food Insecurity: No Food Insecurity (6/3/2023)    Received from [facility]    Hunger Vital Sign     Worried About Running Out of Food in the Last Year: Never true     Ran Out of Food in the Last Year: Never true   Depression: Not at risk (11/3/2023)    [facility] from [facility], [facility]    PHQ-2     PHQ-2 TOTAL SCORE: 0   Housing Stability: Low Risk  (6/3/2023)    Received from [facility]    Housing Stability     What is your living situation today?: I have a steady place to live   Tobacco Use: Low Risk  (9/27/2024)    Patient History     Smoking Tobacco Use: Never     Smokeless Tobacco Use: Never     Passive Exposure: Not on file   Financial Resource Strain: Low Risk  (6/3/2023)    Received from [facility]    Overall Financial Resource Strain (CARDIA)     Difficulty of Paying Living Expenses: Not hard at all   Alcohol Use: Not on file   Transportation Needs: No Transportation Needs (6/3/2023)    Received from [facility]    PRAPARE - Transportation     Lack of Transportation (Medical): No     Lack of Transportation (Non-Medical): No   Physical Activity: Sufficiently Active (6/3/2023)    Received from [facility]    Exercise Vital Sign     Days of Exercise per Week: 5 days     Minutes of Exercise per Session: 60 min   Interpersonal Safety: Not At Risk (6/3/2023)    Received from [facility]    Humiliation, Afraid, Rape, and Kick questionnaire     Fear of Current or Ex-Partner: No     Emotionally Abused: No     Physically Abused: No     Sexually Abused: No    Stress: Not on file   Social Connections: Unknown (10/5/2023)    [facility]    Social Connections     Frequency of Communication with Friends and Family: Not on file   Health Literacy: Not on file     Functional Status:  Prior to admission patient needed assistance:   Dependent ADLs: Bathing, Grooming  Dependent IADLs: Cleaning, Cooking, Laundry, Shopping, Medication Management, Money Management, Transportation     Mental Health Status:  Mental Health Status: No Current Concerns       Chemical Dependency Status:  Chemical Dependency Status: No Current Concerns           Values/Beliefs:  Spiritual, Cultural Beliefs, Taoist Practices, Values that affect care: other (see comments) (did not assess)             Discussed  Partnership in Safe Discharge Planning  document with patient/family: No    Additional Information: SW met with pt and his friend who was visiting at bedside. Friend gave me Bill's social history information.    MARY spoke with Peg (dtr) via phone who shared that we can update Bill's address so that mail gets sent to her directly. MARY called Registration and had this updated.    MARY spoke with Demetra (nurse) at [facility] who shared that for discharge, we would need to call in advance to make sure they have an RN on duty. We would need to arrange for pt to arrive back home by 12:00 pm. Please fax orders to the # below.    [address] [place] [address]  Nurse direct: [phone]  Nurse fax: [phone]    Next Steps: MARY will follow for discharge planning.    DANA Kim   Assisting on ED/Obs  ED/Obs MARY Desk: [phone]

## 2024-10-03 NOTE — PROGRESS NOTES
Neuro: A&Ox1, oriented to person only. Disoriented to time and baseline, confused at baseline, has dementia.   Cardiac: SR. VSS. Chronic Afib   Respiratory: Sating 100% on RA.  GI/: Adequate urine output, voiding bedside urinal. Strict I/Os for stools   Diet/appetite: Tolerating regular diet. Eating well.  Activity:  Assist of 2, not OOB this shift   Pain: denies pain   Skin: No new deficits noted.  LDA's: L PIV SL     Plan: Monitoring stools. 1:1 sitter in place for confusion. Family care consult today, chose to take less invasive options. Can be more confused in the evening / at night. Gave PRN seroquel, notified team for PRN IV medications.

## 2024-10-03 NOTE — PLAN OF CARE
Blood pressure 108/76, pulse 55, temperature 97.9  F (36.6  C), temperature source Oral, resp. rate 16, SpO2 100%.     Neuro: Oriented to person, disoriented to time and place. Confused at baseline, dementia.   Cardiac: SR. VSS. Chronic A fib.   Respiratory: Sating 100 on RA.  GI/: UA needed. Commode at bedside. Urinal at bedside. Collect stool sample if there is blood in the stool. Pt did not use the bathroom during shift.   Diet/appetite: NPO  Activity:  Assist of 2. Bed alarm on. Pt door open throughout the night due to risk for falls.   Pain: At acceptable level on current regimen. Denies pain.   Skin: No new deficits noted.  LDA's: L PIV, saline locked.     Plan: Continue with POC. Notify primary team with changes. Monitor hemoglobin levels.     Hemoglobin at 2129 was 7.5, provider notified.     Goal Outcome Evaluation:      Plan of Care Reviewed With: patient    Overall Patient Progress: no change

## 2024-10-03 NOTE — CONSULTS
Electrophysiology Consultation Note   EP Attending: .   Reason for consultation: AF, ? SSS.   Provider requesting consultation: .  Date of Service: 10/3/2024      HPI:   Mr. Tran is a 79 year old male who has a medical history significant for chronic AF (CHADSVASC 2 on Eliquis), HLD, orthostatic hypotension, dizziness, BPH, prostate CA s/p radiation 2019, hx internal hemorrhoids bleeding, diverticulosis, colon polyps, and Alzheimer's disease.   He was first found to have AF/AFL in early 2023 when he presented for a hernia repair. It was felt to be only mildly symptomatic. He was placed on Eliquis and had a DCCV on 8/11/23. He had early recurrence after DCCV.  He states he did not notice any improvement in his exercise capacity or sense of well being when he was in sinus and did not know when AF had recurred. Decision was made to do rate control only. He had been on metoprolol 25 mg p.o. b.i.d., but became fatigued and lightheaded with that, so that had been cut back to 12.5 mg twice a day.  A zio patch monitor from 9/3/23-9/6/23 from OSH showed AF throughout with Avg HR 90 bpm. An echo from 5/2023 at OSH showed LVEF 59%, normal RV size/function, severely enlarged LA, and no significant valvular abnormalities. A NM Stress test from OSH in 7/2023 was negative for ischemia. His HRs were noted to be a little higher at an office visit in 9/5/24 and he was started on Digoxin. He was reporting episodes of lightheadedness so a zio patch monitor was placed. A zio ptch monitor from 9/5/24-9/19/24 from OSH showed AFL throughout with avg HR 89 bpm.  He then presented to ER on 10/2/24 with dizziness, found to be bradycardic and hypotensive, with Hgb 7.9 down from 11.8 three months ago, and found to have dried blood around the anus and red-tinged stool on JIE. On admission has been SB 50-60s. BPs  /50-80s. PTA Eliquis, metoprolol, and digoxin were held. GI has been consulted. SCr 1.59, GFR 44,  electrolytes WDL, Hgb 8.3 (7.5 on admission). Digoxin level 0.8 (within therapeutic range) on 10/2/24.  VSS. Current cardiac medications: Simvastatin.   Past Medical History:   Past Medical History:   Diagnosis Date    Alzheimer's disease (H)     Chronic a-fib (H)     Dupuytren contracture     Elevated prostate specific antigen (PSA)     History of colonic polyps     HLD (hyperlipidemia)     Prostate cancer (H)     radiation therapy 12/2019    Tear of lateral meniscus of left knee     Vitamin B12 deficiency (non anemic)     Vitamin D deficiency      Past Surgical History:   Past Surgical History:   Procedure Laterality Date    BIOPSY      prostate x4    COLONOSCOPY  2012    COLONOSCOPY  2009    COLONOSCOPY  2007    HERNIA REPAIR Right 1998    POLYPECTOMY      WEDGE RESECTION EYELID Bilateral 3/14/2024    Procedure: Left Lower Lid Wedge Excision of Lesion with Frozen Section Control and Reconstruction;  Surgeon: Suhas Flores MD;  Location:  OR     Allergies: Per MAR   No Known Allergies  Medications:   Per MAR current outpatient cardiovascular medications include: (Not in a hospital admission)    Current Outpatient Medications   Medication Sig Dispense Refill    apixaban ANTICOAGULANT (ELIQUIS) 5 MG tablet Take 1 tablet (5 mg) by mouth 2 times daily 60 tablet 11    cyanocobalamin (VITAMIN B-12) 1000 MCG tablet Take 1 tablet (1,000 mcg) by mouth daily 30 tablet 11    digoxin (LANOXIN) 125 MCG tablet Take 125 mcg by mouth daily.      gabapentin (NEURONTIN) 100 MG capsule 100 mg po bid plus 100 mg po daily prn anxiety (Patient taking differently: Take 100 mg by mouth 3 times daily.) 60 capsule 11    metoprolol tartrate (LOPRESSOR) 25 MG tablet Take 0.5 tablets (12.5 mg) by mouth 2 times daily 30 tablet 11    QUEtiapine (SEROQUEL) 25 MG tablet Take 0.5 tablets (12.5 mg) by mouth daily as needed (aggression/agitation). 15 tablet 11    sertraline (ZOLOFT) 50 MG tablet Take 1.5 tablets (75 mg) by mouth daily 60  tablet 11    simvastatin (ZOCOR) 20 MG tablet Take 1 tablet (20 mg) by mouth at bedtime 30 tablet 11    tamsulosin (FLOMAX) 0.4 MG capsule Take 1 capsule (0.4 mg) by mouth at bedtime 30 capsule 11     Current Facility-Administered Medications   Medication Dose Route Frequency Provider Last Rate Last Admin    [Held by provider] apixaban ANTICOAGULANT (ELIQUIS) tablet 5 mg  5 mg Oral BID Arleth Rodriguez MD        calcium carbonate (TUMS) chewable tablet 1,000 mg  1,000 mg Oral 4x Daily PRN Arleth Rodriguez MD        cyanocobalamin (VITAMIN B-12) tablet 1,000 mcg  1,000 mcg Oral Daily Arleth Rodriguez MD   1,000 mcg at 10/03/24 0834    digoxin (LANOXIN) tablet 125 mcg  125 mcg Oral Daily Arleth Rodriguez MD        Elemental iron 65 mg Vitamin C 125 mg (VITRON C) tablet 1 tablet  1 tablet Oral Daily Arleth Rodriguez MD        [Held by provider] gabapentin (NEURONTIN) capsule 100 mg  100 mg Oral TID Arleth Rodriguez MD        lidocaine (LMX4) cream   Topical Q1H PRN Arleth Rodriguez MD        lidocaine 1 % 0.1-1 mL  0.1-1 mL Other Q1H PRN Arleth Rodriguez MD        [Held by provider] metoprolol tartrate (LOPRESSOR) half-tab 12.5 mg  12.5 mg Oral BID Arleth Rodriguez MD        pantoprazole (PROTONIX) IV push injection 40 mg  40 mg Intravenous BID Arleth Rodriguez MD   40 mg at 10/03/24 0834    polyethylene glycol (MIRALAX) Packet 17 g  17 g Oral BID PRN Arleth Rodriguez MD        QUEtiapine (SEROquel) half-tab 12.5 mg  12.5 mg Oral Daily PRN Arleth Rodriguez MD senna-docusate (SENOKOT-S/PERICOLACE) 8.6-50 MG per tablet 1 tablet  1 tablet Oral BID PRN Arleth Rodriguez MD        Or    senlanete (SENOKOT-S/PERICOLACE) 8.6-50 MG per tablet 2 tablet  2 tablet Oral BID PRN Arleth Rodriguez MD        sertraline (ZOLOFT) tablet 75 mg  75 mg Oral Daily Arleth Rodriguez MD   75 mg at 10/03/24 0834    [Held by provider] simvastatin (ZOCOR) tablet 20 mg  20 mg Oral At Bedtime Arleth Rodriguez  MD SHERIF        sodium chloride (PF) 0.9% PF flush 3 mL  3 mL Intracatheter Q8H Arleth Rodriguez MD   3 mL at 10/03/24 0838    sodium chloride (PF) 0.9% PF flush 3 mL  3 mL Intracatheter q1 min prn Arleth Rodriguez MD        [Held by provider] tamsulosin (FLOMAX) capsule 0.4 mg  0.4 mg Oral At Bedtime Arleth Rodriguez MD         Current Outpatient Medications   Medication Sig Dispense Refill    apixaban ANTICOAGULANT (ELIQUIS) 5 MG tablet Take 1 tablet (5 mg) by mouth 2 times daily 60 tablet 11    cyanocobalamin (VITAMIN B-12) 1000 MCG tablet Take 1 tablet (1,000 mcg) by mouth daily 30 tablet 11    digoxin (LANOXIN) 125 MCG tablet Take 125 mcg by mouth daily.      gabapentin (NEURONTIN) 100 MG capsule 100 mg po bid plus 100 mg po daily prn anxiety (Patient taking differently: Take 100 mg by mouth 3 times daily.) 60 capsule 11    metoprolol tartrate (LOPRESSOR) 25 MG tablet Take 0.5 tablets (12.5 mg) by mouth 2 times daily 30 tablet 11    QUEtiapine (SEROQUEL) 25 MG tablet Take 0.5 tablets (12.5 mg) by mouth daily as needed (aggression/agitation). 15 tablet 11    sertraline (ZOLOFT) 50 MG tablet Take 1.5 tablets (75 mg) by mouth daily 60 tablet 11    simvastatin (ZOCOR) 20 MG tablet Take 1 tablet (20 mg) by mouth at bedtime 30 tablet 11    tamsulosin (FLOMAX) 0.4 MG capsule Take 1 capsule (0.4 mg) by mouth at bedtime 30 capsule 11     Family History:   Family History   Problem Relation Age of Onset    Prostate Cancer Father     Prostate Cancer Brother      Social History:   Social History     Tobacco Use    Smoking status: Never    Smokeless tobacco: Never   Substance Use Topics    Alcohol use: Not Currently     Comment: 1-2 drinks per week       ROS:   A comprehensive 10 point ROS was negative other than as mentioned in HPI.    Physical Examination:   VITALS: /73 (BP Location: Right arm)   Pulse 57   Temp 98.1  F (36.7  C) (Oral)   Resp 16   SpO2 96%   GENERAL APPEARANCE:  NAD, impulsive, sitter at  bedside  HEENT: PERRLA.  MMM.   NECK: Supple.   CHEST: CTAB   CARDIOVASCULAR: S1S2, Reg  ABDOMEN: BS+, soft, NT, ND.  EXTREMITIES: trace pedal edema. Distal pulses intact.   NEURO: impulsive, forgetful, known Alzheimer's   SKIN: Warm and dry.   Data:   Labs:  BMP  Recent Labs   Lab 10/03/24  0610 10/02/24  1040    140   POTASSIUM 4.4 4.8   CHLORIDE 109* 110*   SUNIL 8.8 8.5*   CO2 20* 22   BUN 24.9* 23.2*   CR 1.59* 1.86*   * 102*     CBC  Recent Labs   Lab 10/03/24  0610 10/02/24  2129 10/02/24  1503 10/02/24  1040   WBC 7.5  --  4.0 4.0   RBC 2.88*  --  2.58* 2.68*   HGB 8.3* 7.5* 7.6* 7.9*   HCT 26.6*  --  24.0* 24.8*   MCV 92  --  93 93   MCH 28.8  --  29.5 29.5   MCHC 31.2*  --  31.7 31.9   RDW 16.0*  --  15.9* 15.9*     --  137* 149*       EK/20/23 NM Stress Test (OSH Report):  IMPRESSION    1. Adequate pharmacologic stress test with regadenoson.    2. The pharmacologic stress ECG was negative for ischemia.    3. Myocardial perfusion was normal.    4. Left ventricular cavity size was normal (resting EDV 72 ml).    5. Overall left ventricular systolic function was normal without wall motion abnormalities. The post stress LVEF was calculated to be 58 %.    6. There were no prior studies available for comparison.     23 Echo (OSH Report):   Final Impressions:    1. Normal left ventricular size, mildly increased wall thickness, normal global systolic function, calculated EF of 59 %.    2. Right ventricular cavity size is normal, global systolic RV function is normal.    3. Severely enlarged left atrium.    4. The aortic valve is sclerotic, no stenosis and trivial regurgitation.    5. The mitral valve is sclerotic, trace mitral regurgitation.    6. Tricuspid valve is normal.    7. The inferior vena cava is dilated, respiratory size variation greater than 50%.    8. Normal estimated pulmonary pressures by tricuspid regurgitation velocity and right atrial pressure (19 mmHg plus RAP).     9. 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   10. No pericardial effusion.     Assessment:   Mr. Tran is a 79 year old male who has a medical history significant for chronic AF (CHADSVASC 2 on Eliquis), HLD, orthostatic hypotension, dizziness, BPH, prostate CA s/p radiation 2019, hx internal hemorrhoids bleeding, diverticulosis, colon polyps, and Alzheimer's disease.   He was first found to have AF/AFL in early 2023 when he presented for a hernia repair. It was felt to be only mildly symptomatic. He was placed on Eliquis and had a DCCV on 8/11/23. He had early recurrence after DCCV.  He states he did not notice any improvement in his exercise capacity or sense of well being when he was in sinus and did not know when AF had recurred. Decision was made to do rate control only. He had been on metoprolol 25 mg p.o. b.i.d., but became fatigued and lightheaded with that, so that had been cut back to 12.5 mg twice a day.  A zio patch monitor from 9/3/23-9/6/23 from OSH showed AF throughout with Avg HR 90 bpm. An echo from 5/2023 at OSH showed LVEF 59%, normal RV size/function, severely enlarged LA, and no significant valvular abnormalities. A NM Stress test from OSH in 7/2023 was negative for ischemia. His HRs were noted to be a little higher at an office visit in 9/5/24 and he was started on Digoxin. He was reporting episodes of lightheadedness so a zio patch monitor was placed. A zio ptch monitor from 9/5/24-9/19/24 from OSH showed AFL throughout with avg HR 89 bpm.  He then presented to ER on 10/2/24 with dizziness, found to be bradycardic and hypotensive, with Hgb 7.9 down from 11.8 three months ago, and found to have dried blood around the anus and red-tinged stool on JIE. On admission has been SB 50-60s. BPs  /50-80s. PTA Eliquis, metoprolol, and digoxin were held. GI has been consulted. SCr 1.59, GFR 44, electrolytes WDL, Hgb 8.3 (7.5 on admission). Digoxin level 0.8 (within  therapeutic range) on 10/2/24.  VSS. Current cardiac medications: Simvastatin.   EP Recommendations:  Atrial Fibrillation:   We had a long discussion about AF, including the natural history of the disease, risk of embolic events, role of antithrombotic therapy, role of rate control therapy, the role of antiarrhythmic medications, and the role of an ablation. We discussed the AFFIRM trial.  1. Stroke Prophylaxis: CHADSVASC ++age 3, corresponding to a 2.2% annual stroke / systemic embolism event rate. He had been on PTA Eliquis. has been having falls, has hx of internal bleeding hemorrhoids, and now with further GIB he has higher bleeding risk. Agree with holding anticoagulation for now. Discussed Watchman could be considered if c/w goals of care.   2. Rate Control: Hold metoprolol and digoxin. Currently SB/SR 50-60s mostly.    3. Rhythm Control: Cardioversion, Antiarrhythmics and/or ablation are options for rhythm control. First diagnosed in early 2023 with AF/AFL. Had DCCV 8/2023 with early recurrent AF. Has been in permanent AF since up until at least 9/19/24 but now has converted to sinus. Does not appear to have symptoms when in AF/AFL. Prior zio patches showed adequate HR control and no pauses or bradycardia. No indication for further rhythm control at this time.   4. Risk Factor Management: Statin, BP control, and ANASTASIYA evaluation as indicated.   Dizziness appears to be mostly orthostatic in nature and not related to bradycardia as he had those symptoms when in AF as well. No current indication for pacing.     Thank you for allowing us to participate in the care of this patient.     The patient was discussed w/ Dr. Metcalf.  The above note reflects our joint plan.    DASH Plummer CNP  Electrophysiology Consult Service  Securely message with [employer]   Text page via [facility] Paging/Directory     [facility] time spent on patient visit, reviewing notes, imaging, labs, orders, and completing necessary documentation:  45 minutes.  >50% of visit spent on counseling patient and/or coordination of care.    EP STAFF NOTE  I have seen and examined the patient as part of a shared visit with Naya Cheung EP NP.  I agree with the note above. I reviewed today's vital signs and medications. I have reviewed and discussed with the advanced practice provider their physical examination, assessment, and plan   Briefly, Afib, now in SR, alzheimer  My key history/exam findings are: RRR.   The key management decisions made by me: dizziness not related to bradycardia, no indication for pacing.  Total time spent on patient visit, reviewing notes, imaging, labs, orders, and completing necessary documentation: 30 minutes.  >50% of visit spent on counseling patient and/or coordination of care.     Gunnar Metcalf MD [facility]  Cardiology - Electrophysiology

## 2024-10-04 VITALS
HEART RATE: 61 BPM | DIASTOLIC BLOOD PRESSURE: 65 MMHG | RESPIRATION RATE: 18 BRPM | SYSTOLIC BLOOD PRESSURE: 105 MMHG | OXYGEN SATURATION: 94 % | TEMPERATURE: 99 F

## 2024-10-04 LAB
ANION GAP SERPL CALCULATED.3IONS-SCNC: 11 MMOL/L (ref 7–15)
BUN SERPL-MCNC: 26.5 MG/DL (ref 8–23)
CALCIUM SERPL-MCNC: 8.5 MG/DL (ref 8.8–10.4)
CHLORIDE SERPL-SCNC: 110 MMOL/L (ref 98–107)
CREAT SERPL-MCNC: 1.51 MG/DL (ref 0.67–1.17)
EGFRCR SERPLBLD CKD-EPI 2021: 47 ML/MIN/1.73M2
ERYTHROCYTE [DISTWIDTH] IN BLOOD BY AUTOMATED COUNT: 16.2 % (ref 10–15)
GLUCOSE SERPL-MCNC: 120 MG/DL (ref 70–99)
HCO3 SERPL-SCNC: 17 MMOL/L (ref 22–29)
HCT VFR BLD AUTO: 23.2 % (ref 40–53)
HGB BLD-MCNC: 7.4 G/DL (ref 13.3–17.7)
MCH RBC QN AUTO: 28.8 PG (ref 26.5–33)
MCHC RBC AUTO-ENTMCNC: 31.9 G/DL (ref 31.5–36.5)
MCV RBC AUTO: 90 FL (ref 78–100)
PLATELET # BLD AUTO: 141 10E3/UL (ref 150–450)
POTASSIUM SERPL-SCNC: 4 MMOL/L (ref 3.4–5.3)
RBC # BLD AUTO: 2.57 10E6/UL (ref 4.4–5.9)
SODIUM SERPL-SCNC: 138 MMOL/L (ref 135–145)
WBC # BLD AUTO: 8.7 10E3/UL (ref 4–11)

## 2024-10-04 PROCEDURE — 99238 HOSP IP/OBS DSCHRG MGMT 30/<: CPT | Mod: GC | Performed by: STUDENT IN AN ORGANIZED HEALTH CARE EDUCATION/TRAINING PROGRAM

## 2024-10-04 PROCEDURE — 82310 ASSAY OF CALCIUM: CPT

## 2024-10-04 PROCEDURE — 258N000003 HC RX IP 258 OP 636

## 2024-10-04 PROCEDURE — 250N000013 HC RX MED GY IP 250 OP 250 PS 637

## 2024-10-04 PROCEDURE — 80048 BASIC METABOLIC PNL TOTAL CA: CPT

## 2024-10-04 PROCEDURE — 250N000011 HC RX IP 250 OP 636

## 2024-10-04 PROCEDURE — 85027 COMPLETE CBC AUTOMATED: CPT

## 2024-10-04 PROCEDURE — 36415 COLL VENOUS BLD VENIPUNCTURE: CPT

## 2024-10-04 PROCEDURE — 99232 SBSQ HOSP IP/OBS MODERATE 35: CPT

## 2024-10-04 RX ORDER — POLYETHYLENE GLYCOL 3350 17 G/17G
17 POWDER, FOR SOLUTION ORAL 2 TIMES DAILY
Status: DISCONTINUED | OUTPATIENT
Start: 2024-10-04 | End: 2024-10-04 | Stop reason: HOSPADM

## 2024-10-04 RX ORDER — METHYLPREDNISOLONE SODIUM SUCCINATE 125 MG/2ML
125 INJECTION INTRAMUSCULAR; INTRAVENOUS
Status: DISCONTINUED | OUTPATIENT
Start: 2024-10-04 | End: 2024-10-04 | Stop reason: HOSPADM

## 2024-10-04 RX ORDER — DIPHENHYDRAMINE HYDROCHLORIDE 50 MG/ML
50 INJECTION INTRAMUSCULAR; INTRAVENOUS
Status: DISCONTINUED | OUTPATIENT
Start: 2024-10-04 | End: 2024-10-04 | Stop reason: HOSPADM

## 2024-10-04 RX ORDER — PANTOPRAZOLE SODIUM 40 MG/1
40 TABLET, DELAYED RELEASE ORAL
Status: DISCONTINUED | OUTPATIENT
Start: 2024-10-04 | End: 2024-10-04 | Stop reason: HOSPADM

## 2024-10-04 RX ORDER — PANTOPRAZOLE SODIUM 40 MG/1
40 TABLET, DELAYED RELEASE ORAL
Qty: 180 TABLET | Refills: 0 | Status: SHIPPED | OUTPATIENT
Start: 2024-10-04

## 2024-10-04 RX ORDER — AMOXICILLIN 250 MG
1 CAPSULE ORAL ONCE
Status: COMPLETED | OUTPATIENT
Start: 2024-10-04 | End: 2024-10-04

## 2024-10-04 RX ORDER — AMOXICILLIN 250 MG
2 CAPSULE ORAL ONCE
Status: COMPLETED | OUTPATIENT
Start: 2024-10-04 | End: 2024-10-04

## 2024-10-04 RX ADMIN — DIGOXIN 125 MCG: 125 TABLET ORAL at 09:10

## 2024-10-04 RX ADMIN — PANTOPRAZOLE SODIUM 40 MG: 40 TABLET, DELAYED RELEASE ORAL at 09:10

## 2024-10-04 RX ADMIN — OLANZAPINE 2.5 MG: 10 INJECTION, POWDER, FOR SOLUTION INTRAMUSCULAR at 00:38

## 2024-10-04 RX ADMIN — Medication 1 TABLET: at 09:09

## 2024-10-04 RX ADMIN — SERTRALINE HYDROCHLORIDE 75 MG: 50 TABLET ORAL at 09:10

## 2024-10-04 RX ADMIN — IRON SUCROSE 200 MG: 20 INJECTION, SOLUTION INTRAVENOUS at 10:38

## 2024-10-04 RX ADMIN — CYANOCOBALAMIN TAB 500 MCG 1000 MCG: 500 TAB at 09:10

## 2024-10-04 ASSESSMENT — ACTIVITIES OF DAILY LIVING (ADL)
ADLS_ACUITY_SCORE: 51
ADLS_ACUITY_SCORE: 52

## 2024-10-04 NOTE — TELEPHONE ENCOUNTER
Prior Authorization Approval    Authorization Effective Date: 7/5/2024  Authorization Expiration Date: 10/3/2025  Medication: QUEtiapine (SEROQUEL) 25 MG tablet  Reference #:     Insurance Company: [employer] - Phone [phone] Fax [phone]  Which Pharmacy is filling the prescription (Not needed for infusion/clinic administered): [facility] - [address]  Pharmacy Notified: Yes  Patient Notified: Instructed pharmacy to notify patient when script is ready to /ship.

## 2024-10-04 NOTE — PROGRESS NOTES
GASTROENTEROLOGY PROGRESS and Sign-Off  NOTE  GI Luminal Service    Date of Admission: 10/2/2024  Reason for Admission:  Acute on Chronic Dizziness, Acute on Chronic Normocytic Anemia.       ASSESSMENT:  Young Lizama is a 79 year old male with a history of Alzheimer's disease, dementia with aggressive behavior, depression, anxiety, chronic atrial fibrillation on chronic anticoagulation (Apixaban) and recently started on Digoxin, chronic dizziness thought to be multifactorial with orthostatic hypotension contributing +/- potential sick sinus syndrome, hyperlipidemia, BPH, prostate cancer treated with radiation 12/2019, vitamin B12 deficiency, basal cell carcinoma status-post left eyelid surgery 3/14/2024, history of bleeding internal hemorrhoids, diverticulosis of the sigmoid colon, colon polyps who presented to the ED on 10/2/2024 from an Assisted Living Center ([facility]) for reported dizziness, found to be bradycardic and hypotensive, A&Ox1. Patient was also found to have normocytic anemia with hemoglobin 7.9 g/dL (10/2/2024) down from 3 months ago at which time it was 11.8 g/dL (7/11/2024). ED performed a digital rectal exam with dried blood around the anus and red-tinged stool, for which the GI Luminal Service was consulted.         # Bright Red Blood per Rectum, resolved  # Acute on Chronic Normocytic Anemia  # History of Internal Hemorrhoids  # Diverticulosis of the Sigmoid Colon  # History of Colon Polyps   # Chronic Atrial Fibrillation on Chronic Anticoagulation (Apixaban)     History limited by Alzheimer's disease with patient oriented only to self.      Per chart review, [facility] for rectal bleeding thought to be due to several excoriated internal hemorrhoids on anoscopy at that time. Most recent colonoscopy in 2020 with [facility] with internal hemorrhoids, diverticulosis of the sigmoid colon and colon polyps resected  with pathology reports 2 tubular adenomas and 2 advanced adenomas.       Scant dried blood around the anus on rectal exam but no stool nor blood in the rectal vault 10/2. Differential for bright red blood per rectum includes rectal outlet bleeding (such as hemorrhoidal though not currently), diverticular bleeding, potentially an upper GI source with hypotension on admission.      Normocytic Anemia on admission with noted decline in hemoglobin from 11.8 g/dL on 7/11/2024 to 7.9 g/dL on 10/2/2024. Iron studies and Ferritin are low so likely component of iron deficiency contributing to acute on chronic anemia. Reticulocytes inappropriately within normal limits despite anemia, so concern for bone marrow issue. Potentially component of acute versus chronic GI blood loss given dried blood around anus.    10/4 Interval Update: Hemoglobin relatively stable from admission, and patient remains hemodynamically stable with no bowel movement since admission. The GI Luminal Endoscopic work-up of anemia is bidirectional GI endoscopy (EGD and Colonoscopy). Discussed patient's clinical course with patient's daughter Peg Hernandez on 10/3/2024 who is the patient's POA. Peg favored continuing supportive cares and conservative management and avoiding invasive procedures including GI endoscopies, which is reasonable in the absence of hemodynamically significant overt GI bleeding.     Continue supportive cares and conservative management, including strict documentation of rectal output and PPI 40 mg BID. Recommend starting Miralax 1-2 capfuls daily for constipation given no bowel movement since before admission; titrate Miralax to promote 1-2 soft, continent, easy to evacuate bowel movements daily (minimum 3 bowel movements per week).         Recommendations:  -- Appreciate ongoing Goals of Care conversations between primary team and patient's daughter/POA Peg Hernandez.   -- Continue Pantoprazole 40 mg BID.   -- Iron supplementation  per primary team for iron deficiency.   -- Diet per primary team.   -- Strict documentation of rectal output.  - Appreciate detailed documentation of stool appearance, including color, consistency, frequency and amount.   - Consider smearing stool thinly on white paper towel to distinguish color.   -- Continue to monitor CBC and CMP.   -- Primary team to follow hematologic anemia work-up results.   - Appreciate ongoing hematologic anemia work-up per primary team.   -- Start scheduled bowel regimen: Miralax 1-2 capfuls daily, titrated to promote 1-2 soft, continent, easy to evacuate bowel movements daily (minimum of 3 bowel movements per week) given no bowel movement since admission.   -- Regarding anticoagulation/antiplatelet therapy, from a GI perspective, no contraindication to restarting/continuing anticoagulation/antiplatelets. Defer decision and risk/benefit discussion to primary team.   -- Analgesia/Antiemetics per primary team.   -- If the patient experiences overt GI bleeding with hemodynamic instability, please page the GI Luminal Service (listed on [facility]).       OUTPATIENT:   -- No outpatient GI follow-up necessary.       COVID status: not tested this admission.     Discussed with Dr. Arleth Rodriguez - [facility].     The inpatient gastroenterology service will sign off at this time. Thank you for allowing us to participate in the care of this patient. Please do not hesitate to page the GI service with any questions or concerns.     The patient was discussed and plan agreed upon with Dr. Avila Rutherford, GI Luminal Service staff physician.    Overall time spent on the date of this encounter preparing to see the patient (including chart review of available notes, clinical status events, imaging and labs); obtaining and/or reviewing separately obtained history from patient's daughter Peg Hernandez; discussing, ordering, coordinating recommended medications, tests or procedures; communicating with other  health care professionals; and documenting the above clinical information in the electronic medical record was 35 minutes.    Sabi Gonzalez PA-C  Inpatient Gastroenterology Service  [facility]  Text Page  _______________________________________________________________      Subjective: Nursing notes and 24hr events reviewed.     Patient seen and examined at 0900. Patient reports doing good.     Denies nausea, vomiting, acid reflux, heartburn, abdominal pain.    Still no bowel movement since admission that nursing staff is aware of.       ROS:   4 pt ROS negative unless noted in subjective.     Objective:  Blood pressure 137/67, pulse 72, temperature 99.4  F (37.4  C), resp. rate 18, SpO2 92%.      General: 79 year old male lying in bed with the head of bed elevated in NAD eating lunch. Appears comfortable.  Answers appropriately.    HEENT: Head is AT/NC. Sclera anicteric. +eye glasses.  Lungs: No increased work of breathing, speaking in full sentences, equal chest rise. On Room Air.   Heart: Regular rate. Peripheral perfusion intact.  Abdomen: Soft, non-tender, non-distended. No peritoneal signs.  Extremities: WWP.  Musculoskeletal: No gross deformity.  Skin: No jaundice or rash on exposed skin.  Neurologic: Grossly non-focal.  CN 2-12 grossly intact.   Mental status/Psych: A&O x1 (self though initially provided last name as first and vice versa; accurately relays date of birth). Pleasant, interactive. +Confabulating.       Previous Procedures:     2/15/2022 - [facility] with [facility]              9/29/2020 - Colonoscopy - [facility], Dr. Jose Barlow  Findings:   Polyp location: transverse colon.  Quantity: 1.  Size: 15 mm.  Polyp shape:  sessile.         Maneuver: polypectomy was performed with a hot snare and hemoclip x 2.        Removal:  complete.  Retrieval: complete.  Bleeding: moderate.   Polyp  location: transverse colon.  Quantity: 1.  Size: 10 mm.  Polyp shape: sessile.         Maneuver: polypectomy was performed with a  hot snare  and hemoclip x 1.        Removal: complete.  Retrieval: complete.  Bleeding: moderate.   Polyp location: transverse colon.  Quantity: 1.  Size: 8 mm.   Polyp shape: sessile.         Maneuver: polypectomy was performed with a cold snare   and hemoclip x 1.        Removal: complete.  Retrieval: complete.  Bleeding: moderate.    Location: transverse colon Quantity: 1.   Size: 3 mm.  Polyp shape: sessile.         Maneuver: polypectomy was performed with a  cold biopsy forceps  .        Removal: complete.  Retrieval: complete.  Bleeding: none.   Diverticulosis.  Location: - sigmoid.      Size:  medium.    Quantity:  several.  No inflammation present.   Anal canal:  internal hemorrhoid(s)   Remainder of the exam is normal.     Impression:   Screening Colonoscopy   Colorectal polyps   Diverticula of colon     Preliminary Plan:   The patient and their physician will receive a copy of the pathology report as well as pathology-based recommendations for future screening or surveillance.   Recommendation Comments:  Avoid NSAIDs for 2 weeks.   Pathology Results:   A: COLON, TRANSVERSE, POLYPS:           1. Tubular adenomas (2) and sessile serrated adenomas (2, larger two polyps)           2. Negative for high grade dysplasia           3. Per the colonoscopy report:               a. Polyp sizes: 3 mm, 8 mm, 10 mm and 15 mm               b. Resection: Complete               c. Retrieval: Complete         8/18/2017 - Colonoscopy - [facility], Dr. Hi Mcmanus  Findings:   Polyp location: transverse colon.  Quantity: 3.  Size:  3 mm, 5 mm, 8 mm.  Polyp shape:  sessile.         Maneuver: polypectomy was performed with a cold biopsy forceps and hot snare.        Removal:  complete.  Retrieval: complete.  Bleeding: none.   Polyp location: descending colon.  Quantity:  2.  Size:  2-3 mm.  Polyp shape: sessile.         Maneuver: polypectomy was performed with a  cold biopsy forceps .        Removal: complete.  Retrieval: complete.  Bleeding: none.   Modest diverticulosis. The colonic mucosa and vascularity is otherwise unremarkable.   Anal canal:  normal     Impression:   Polyp of colon, unspecified part of colon, unspecified type     Pathology Results:   A: COLON, TRANSVERSE,  POLYPS:   1. Tubular adenomas (3)   2. No evidence of high grade dysplasia   3. Per the attached endoscopy report:     a. Polyp sizes: 3 mm - 8 mm     b. Resection: Complete     c. Retrieval: Complete   B: COLON, DESCENDING, POLYPS:   1. Tubular adenoma (1) and hyperplastic polyp (1)   2. No high grade dysplasia present   3. Per the colonoscopy report:     a. Polyp sizes: 2 mm - 3 mm     b. Resection: Complete     c. Retrieval: Complete         10/31/2012 - Colonoscopy - [facility], Dr. Hernandez Beck             LABS:  BMP  Recent Labs   Lab 10/04/24  0601 10/03/24  0610 10/02/24  1040    139 140   POTASSIUM 4.0 4.4 4.8   CHLORIDE 110* 109* 110*   SUNIL 8.5* 8.8 8.5*   CO2 17* 20* 22   BUN 26.5* 24.9* 23.2*   CR 1.51* 1.59* 1.86*   * 106* 102*     CBC  Recent Labs   Lab 10/04/24  0601 10/03/24  0610 10/02/24  2129 10/02/24  1503 10/02/24  1040   WBC 8.7 7.5  --  4.0 4.0   RBC 2.57* 2.88*  --  2.58* 2.68*   HGB 7.4* 8.3*   < > 7.6* 7.9*   HCT 23.2* 26.6*  --  24.0* 24.8*   MCV 90 92  --  93 93   MCH 28.8 28.8  --  29.5 29.5   MCHC 31.9 31.2*  --  31.7 31.9   RDW 16.2* 16.0*  --  15.9* 15.9*   * 164  --  137* 149*    < > = values in this interval not displayed.     INRNo lab results found in last 7 days.  LFTs  Recent Labs   Lab 10/03/24  0610 10/02/24  1040   ALKPHOS 59 45   AST 25 18   ALT 23 12   BILITOTAL 0.4 0.3   PROTTOTAL 6.7 6.2*   ALBUMIN 4.1 3.8      PANCNo lab results found in last 7 days.      IMAGING:     XR CHEST 2 VIEWS, 10/2/2024 11:34 AM  Indication: dizzy, weakness      Comparison: None     Findings:      Frontal and lateral projection radiograph of the chest. Apparent  elevation of the left hemidiaphragm. No discernible pneumothorax.  Atherosclerotic calcification of the thoracic aorta. Streaky bilateral  basilar predominant pulmonary opacities                                                                   Impression:   1.Streaky bilateral basilar predominant pulmonary opacities, may  represent atelectasis/pulmonary edema and/ or infiltrate.  2. Apparent elevation of the left hemidiaphragm        CT HEAD W/O CONTRAST  10/2/2024 11:30 AM   HISTORY:  dizziness, chronic anticoagulation        COMPARISON:  None     TECHNIQUE: Using multidetector thin collimation helical acquisition  technique, axial, coronal and sagittal CT images from the skull base  to the vertex were obtained without intravenous contrast.   (topogram) image(s) also obtained and reviewed.     FINDINGS:  No acute intracranial hemorrhage, mass effect, or midline shift. No  acute loss of gray-white matter differentiation in the cerebral  hemispheres. Ventricles are proportionate to the cerebral sulci. Clear  basal cisterns. Hypoattenuation of the periventricular white matter  which is nonspecific but favored to represent chronic small vessel  ischemic disease given patient's age. Moderate generalized parenchymal  atrophy.     The bony calvaria and the bones of the skull base are normal. Mucous  retention cysts in the left maxillary sinus. The mastoid air cells are  clear. Grossly normal orbits.                                                                    IMPRESSION:   1. No acute intracranial pathology.   2. Findings suggestive of moderate chronic small vessel ischemic  disease.

## 2024-10-04 NOTE — CONSULTS
Care Management Discharge Note    Discharge Date: 10/04/2024 with a ride time of 10:45 - 11:30     Discharge Disposition: Assisted Living-  [facility]  [address] [place] [address]  Nurse direct: [phone]  Nurse fax: [phone]    Discharge Services: Housekeeping/[employer], PCA, Transportation Services    Discharge DME: n/a    Discharge Transportation: agency -  Health  stretcher - [phone]    Private pay costs discussed: transportation costs- discussed with dtrPeg, that they would be billed if insurance does not cover    Does the patient's insurance plan have a 3 day qualifying hospital stay waiver?  Yes     Which insurance plan 3 day waiver is available? Alternative insurance waiver    Will the waiver be used for post-acute placement? No    PAS Confirmation Code:  n/a    Patient/family educated on Medicare website which has current facility and service quality ratings: no    Education Provided on the Discharge Plan: Yes    Persons Notified of Discharge Plans: Pt's dtr, facility    Patient/Family in Agreement with the Plan: yes    Handoff Referral Completed: Yes, [facility] PCP: Internal handoff referral completed    Additional Information: SW consult placed for discharge planning. MARY sent message to Dr. Rodriguez to ask if pt is medically stable for discharge today. Shared that the facility would like him to return by 12:00 pm.    Ogle from provider that pt is medically stable, and can they take him back even though IM Zyprexa was administered?    MARY spoke with Katie at [facility] who wanted to know what was the behavior that warranted administering IM Zyprexa. MARY read her her RN note from 7:21 pm but noted there isn't a note from after midnight which is when it was administered. Katie asked if pt is really okay to discharge given this was not that long ago the incident happened. MARY told her that per the provider pt is done with his hospital work up and can  return home. Katie reported they will accept him. Need discharge orders ASAP.    MARY scheduled stretcher ride for 10:45-11:30  window. MAYR informed Katie of ride time.    MARY informed provider of ride time.    MARY faxed discharge orders to facility at 9:56 am. MARY called Katie and told her to call my direct dial in case she does not receive the fax or if she has questions.    Addendum: MARY completed PCS form and faxed it to billing. MARY put PCS form on pt's hard chart.    DANA Kim  Float   Covering ED/Obs  ED/Obs MARY Desk: [phone]

## 2024-10-04 NOTE — PROGRESS NOTES
Patient is alert and oriented to self (baseline). Hx of Alzheimer's.   VSS, PIV removed, patient has all belongings. Patient left unit via stretcher with EMS to return to Assisted living [facility].     /65 (BP Location: Right arm)   Pulse 61   Temp 99  F (37.2  C) (Oral)   Resp 18   SpO2 94%

## 2024-10-04 NOTE — PROGRESS NOTES
"Pt agitated, loud/assertive conversations, pt is illogical. Talking about \"watching the kill channel because they're coming to kill all 6 of us (only 3 people in the room)\" and \"I might go back to care home later\".     RN implemented calm/quiet environment, walking pt, talk therapy/redirection, and PRN Quetiapine. Pt calming slightly. Will pass on to oncoming RN to monitor and use PRN IM meds if needed.   "

## 2024-10-05 NOTE — DISCHARGE SUMMARY
[facility]  Discharge Summary - Medicine & Pediatrics       Date of Admission:  10/2/2024  Date of Discharge:  10/4/2024  Discharging Provider: Dr. Fowler  Discharge Service: Medicine Service, LINDA TEAM 1    Discharge Diagnoses   Probable lower GI bleed  Acute blood loss anemia  Iron deficiency anemia  Normocytic anemia  Chronic atrial fibrillation   CKD stage 3    Clinically Significant Risk Factors          Follow-ups Needed After Discharge   Follow-up Appointments     Follow Up and recommended labs and tests      Follow up with specialist, Electrophysiology , in 1 months.  No follow up   labs or test are needed.    Follow up with PCP in 1 week for recent hospitalization recommended tests:   CBC, BMP        PCP should also have conversation with patient's daughter Peg in regards to patient's goals of care when it comes to procedures such as scopes as it is possible the patient may have a bleed that will need to be addressed.  The patient would likely require MAC sedation, and it would be important to have these goals discussed ahead of time before procedures to elucidate patient's anemia are pursued.    Unresulted Labs Ordered in the Past 30 Days of this Admission       Date and Time Order Name Status Description    10/2/2024  5:06 PM Methylmalonic Acid In process         These results will be followed up by PCP    Discharge Disposition   Discharged to assisted living  Condition at discharge: [facility]    Hospital Course   Young Tran is a 79 year old male with a history of Alzheimer's, Afib on chronic anticoagulation and recently started digoxin, chronic multifactorial dizziness with orthostatic hypotension, HLD, BPH, prostate cancer s/p radiation in 2019, hx of bleeding internal hemorrhoids, diverticulosis, and colon polyps who presented to the ED for dizziness, found to be bradycardic and hypotensive, with Hgb 7.9 down from 11.8 three months ago, and found to have  dried blood around the anus and red-tinged stool on JIE concerning for GI bleed.  Patient was monitored and was found to have stable hemoglobin levels.  After discussion with Peg (patient's daughter) and GI it was determined that patient would have conservative management pursued in the setting of of this anemia.  Patient was also evaluated by electrophysiology during this time for concerns of sick sinus syndrome.  Electrophysiology reviewed patient's Zio patch and did not feel that he showed evidence of sick sinus syndrome with heart rates at 89-90 bpm.  Patient found to currently have converted into sinus rhythm.  Given the change in rhythm and after a thorough discussion of the risks and benefits with both patient's care decision maker Peg and electrophysiology was decided that patient would stop his Eliquis given his risk of falls in the setting of worsening dizziness and now GI bleed.  Patient would that her or be followed up as an outpatient to discuss devices such as a watchman if these devices were considered within his goals of care.  Electrophysiology also recommended stopping his metoprolol and digoxin at this time.  Given patient's stability in his hemoglobin and reassuring physical exam patient was discharged to his memory care facility for continued outpatient workup and goals of care discussions.  The following problems were addressed during his hospitalization:    Concern for GI bleed  Normocytic anemia  Hypotension  Patient presented with dizziness, bradycardia and hypotension, also found to have dried blood around the anus and red-tinged stool on JIE in the ED. Also found to have hemoglobin of 7.9, down from 11.8 three months ago. Together, this raises concern for GI bleed. Blood type and screen complete, AB+.  After discussion with patient's medical care decision maker Peg and GI will opt to pursue a conservative approach of monitoring at this time. Hemoglobin found to be stable.  Patient given  1 dose of IV Venofer and started on oral iron with hemoglobin monitoring to continue outpatient.  -Twice daily oral pantoprazole 40 mg  -Will need to have goals of care discussion with PCP and daughter Peg in regards to patient's goals of care when it comes to procedures such as scopes as it is possible the patient may have a bleed that will need to be addressed.  The patient would likely require MAC sedation, and it would be important to have these goals discussed ahead of time before procedures to elucidate patient's anemia are pursued.  -Eliquis stopped after discussion with Peg and Electrophysiology regarding the risks and benefits      #C/f constipation  Patient with no documented bowel movement in 3 days.  Concern for constipation at this time.  While not precluding patient to remain hospitalized here in the setting we will send patient to [facility] for continued supportive cares with MiraLAX 1-2 capfuls daily for constipation given no bowel movement since before admission; titrate Miralax to promote 1-2 soft, continent, easy to evacuate bowel movements daily (minimum 3 bowel movements per week)   -Titrate MiraLAX to bowel movement      Chronic atrial fibrillation   Possible sick sinus syndrome  Has had periods of bradycardia and tachycardia. Outpatient cardiologist mentioned possibility of sick sinus syndrome, so we will plan to involve electrophysiology while here. He has been wearing a zio patch for the last several weeks, so that was sent for analysis.  Electrophysiology consulted for further workup and management.  Per discussion with electrophysiology and daughter Peg see above it was decided that patient should hold his anticoagulation.  -Stop Eliquis  -Zio patch showed good response with no concerns of sick sinus syndrome. Rates were between 89-90 with no signs of negin.   -Stop digoxin on discharge  -Stop metoprolol discharge  -Follow-up with electrophysiology to discuss Watchman if  within goals of care            Depression  Anxiety  - continue PTA sertraline   - Restart PTA gabapentin     Alzheimers  Aggression   - PTA quetiapine   - 1:1 sitter to help with reorienting patient     Consultations This Hospital Stay   GI LUMINAL ADULT IP CONSULT  CARDIOLOGY ELECTROPHYSIOLOGY (EP) IP CONSULT  CARE MANAGEMENT / SOCIAL WORK IP CONSULT  SOCIAL WORK IP CONSULT    Code Status   No CPR- Pre-arrest intubation OK       The patient was discussed with MD Jessica Crowell [address] [place] [address] EMERGENCY DEPARTMENT  [address]  [facility] [place] [address]  Phone: [phone]  ______________________________________________________________________    Physical Exam   Vital Signs: Temp: 99  F (37.2  C) Temp src: Oral BP: 105/65 Pulse: 61   Resp: 18 SpO2: 94 % O2 Device: None (Room air)    Weight: 0 lbs 0 oz  General Appearance: Awake, alert, NAD  Respiratory: Breathing well on room air  Cardiovascular: RRR no murmurs  GI: Soft, nontender, nondistended  Skin: No open ulcers or rashes present on visible skin      Primary Care Physician   Juma Love    Discharge Orders      Adult Cardiology Alan Delgado Referral      General info for SNF    Length of Stay Estimate: Long Term Care  Condition at Discharge: Stable  Level of care:board and care  Rehabilitation Potential: Fair  Admission H&P remains valid and up-to-date: Yes  Recent Chemotherapy: N/A  Use Nursing Home Standing Orders: Yes     Mantoux instructions    Give two-step Mantoux (PPD) Per Facility Policy Yes     Follow Up and recommended labs and tests    Follow up with specialist, Electrophysiology , in 1 months.  No follow up labs or test are needed.    Follow up with PCP in 1 week for recent hospitalization recommended tests: CBC, BMP     Reason for your hospital stay    You were seen for concern for GI bleed. After discussion with your care decision maker and the GI team it was decided to watch and wait to see if your  blood labs were stable. At this time they have been stable. We also discussed your care with electrophysiology and after discussions  of the risks and benefits we will stop your Apixaban. They are recommend stopping your metoprolol and digoxin. We gave you some iron to help with your anemia.   Please follow up with your PCP for a CBC check to make sure your labs are stable. We will also have you follow up with cardiology for further discussion about your anticoagulation and possible procedure.     Activity - Up ad deon     No CPR- Pre-arrest intubation OK     Fall precautions     Diet    Follow this diet upon discharge: Current Diet:Orders Placed This Encounter      Regular Diet Adult       Significant Results and Procedures   Results for orders placed or performed during the hospital encounter of 10/02/24   CT Head w/o Contrast    Narrative    EXAM: CT HEAD W/O CONTRAST  10/2/2024 11:30 AM     HISTORY:  dizziness, chronic anticoagulation       COMPARISON:  None    TECHNIQUE: Using multidetector thin collimation helical acquisition  technique, axial, coronal and sagittal CT images from the skull base  to the vertex were obtained without intravenous contrast.   (topogram) image(s) also obtained and reviewed.    FINDINGS:  No acute intracranial hemorrhage, mass effect, or midline shift. No  acute loss of gray-white matter differentiation in the cerebral  hemispheres. Ventricles are proportionate to the cerebral sulci. Clear  basal cisterns. Hypoattenuation of the periventricular white matter  which is nonspecific but favored to represent chronic small vessel  ischemic disease given patient's age. Moderate generalized parenchymal  atrophy.    The bony calvaria and the bones of the skull base are normal. Mucous  retention cysts in the left maxillary sinus. The mastoid air cells are  clear. Grossly normal orbits.       Impression    IMPRESSION:   1. No acute intracranial pathology.   2. Findings suggestive of  moderate chronic small vessel ischemic  disease.    I have personally reviewed the examination and initial interpretation  and I agree with the findings.    CATHY GOSS MD         SYSTEM ID:  [id number]   XR Chest 2 Views    Narrative    Exam: XR CHEST 2 VIEWS, 10/2/2024 11:34 AM    Indication: dizzy, weakness    Comparison: None    Findings:     Frontal and lateral projection radiograph of the chest. Apparent  elevation of the left hemidiaphragm. No discernible pneumothorax.  Atherosclerotic calcification of the thoracic aorta. Streaky bilateral  basilar predominant pulmonary opacities      Impression    Impression:     1.Streaky bilateral basilar predominant pulmonary opacities, may  represent atelectasis/pulmonary edema and/ or infiltrate.  2. Apparent elevation of the left hemidiaphragm    DAISHA LINO MD         SYSTEM ID:  [id number]       Discharge Medications   Discharge Medication List as of 10/4/2024 11:27 AM        START taking these medications    Details   Elemental iron 65 mg Vitamin C 125 mg (VITRON C)  MG TABS tablet Take 1 tablet by mouth daily., Disp-90 tablet, R-0, E-Prescribe      pantoprazole (PROTONIX) 40 MG EC tablet Take 1 tablet (40 mg) by mouth 2 times daily (before meals)., Disp-180 tablet, R-0, E-Prescribe           CONTINUE these medications which have NOT CHANGED    Details   cyanocobalamin (VITAMIN B-12) 1000 MCG tablet Take 1 tablet (1,000 mcg) by mouth daily, Disp-30 tablet, R-11, E-Prescribe      gabapentin (NEURONTIN) 100 MG capsule 100 mg po bid plus 100 mg po daily prn anxiety, Disp-60 capsule, R-11, E-Prescribe      QUEtiapine (SEROQUEL) 25 MG tablet Take 0.5 tablets (12.5 mg) by mouth daily as needed (aggression/agitation)., Disp-15 tablet, R-11, E-Prescribe      sertraline (ZOLOFT) 50 MG tablet Take 1.5 tablets (75 mg) by mouth daily, Disp-60 tablet, R-11, E-Prescribe      simvastatin (ZOCOR) 20 MG tablet Take 1 tablet (20 mg) by mouth at bedtime, Disp-30 tablet,  R-11, E-Prescribe      tamsulosin (FLOMAX) 0.4 MG capsule Take 1 capsule (0.4 mg) by mouth at bedtime, Disp-30 capsule, R-11, E-Prescribe           STOP taking these medications       apixaban ANTICOAGULANT (ELIQUIS) 5 MG tablet Comments:   Reason for Stopping:         digoxin (LANOXIN) 125 MCG tablet Comments:   Reason for Stopping:         metoprolol tartrate (LOPRESSOR) 25 MG tablet Comments:   Reason for Stopping:             Allergies   No Known Allergies

## 2024-10-06 ENCOUNTER — TELEPHONE (OUTPATIENT)
Dept: GERIATRICS | Facility: CLINIC | Age: 79
End: 2024-10-06
Payer: COMMERCIAL

## 2024-10-06 DIAGNOSIS — Z51.5 HOSPICE CARE PATIENT: Primary | ICD-10-CM

## 2024-10-06 RX ORDER — HYDROMORPHONE HYDROCHLORIDE 2 MG/1
2 TABLET ORAL
Qty: 10 TABLET | Refills: 0 | Status: SHIPPED | OUTPATIENT
Start: 2024-10-06

## 2024-10-06 RX ORDER — LORAZEPAM 0.5 MG/1
0.5 TABLET ORAL EVERY 4 HOURS PRN
Qty: 10 TABLET | Refills: 0 | Status: SHIPPED | OUTPATIENT
Start: 2024-10-06

## 2024-10-06 NOTE — TELEPHONE ENCOUNTER
[facility] TRIAGE ENCOUNTER    Chief Complaint   Patient presents with    change in condition    comfort cares       Young Tran is a 79 year old  (1945), Nurse called today to report: blood pressures today, orthstatic sitting-100/70,  standing-85/70, 81 percent on room air, pale, dusky. Had syncopal episode in the facility this AM. Family elected to nbot hospitalize or work up in faciilty and would like a referral to hospice and comfort cares.    ASSESSMENT/PLAN  Added hydromorphone due to renal function 2 mg every 2 hours prn Pain/sob  Ativan 0.5 mg every 4 hours PRN  Levsin every 4 hours PRN  Hospice referral    Electronically signed by:   Jeanne Ruano NP

## 2024-10-08 ENCOUNTER — TELEPHONE (OUTPATIENT)
Dept: GERIATRICS | Facility: CLINIC | Age: 79
End: 2024-10-08
Payer: COMMERCIAL

## 2024-10-08 LAB — METHYLMALONATE SERPL-SCNC: 0.24 UMOL/L (ref 0–0.4)

## 2024-10-09 NOTE — TELEPHONE ENCOUNTER
Prior Authorization Retail Medication Request    Medication/Dose: LORazepam (ATIVAN) 0.5 MG tablet  Diagnosis and ICD code (if different than what is on RX):    Hospice care patient [Z51.5]  - Primary        New/renewal/insurance change PA/secondary ins. PA:  Previously Tried and Failed:    Rationale:      Insurance   Primary: [facility] MEDICARE ADVANTAGE  Insurance ID:  [id number]      Secondary (if applicable):  Insurance ID:      Pharmacy Information (if different than what is on RX)  Name:    Phone:    Fax    Clinic Information  Preferred routing pool for dept communication:

## 2024-10-10 NOTE — TELEPHONE ENCOUNTER
Central Prior Authorization Team   Phone: [phone]    PA Initiation    Medication: LORazepam (ATIVAN) 0.5 MG tablet  Insurance Company: [employer] - Phone [phone] Fax [phone]  Pharmacy Filling the Rx: [facility] [address] [place] [address]  Filling Pharmacy Phone: [phone]  Filling Pharmacy Fax:    Start Date: 10/10/2024

## 2024-10-14 NOTE — TELEPHONE ENCOUNTER
Prior Authorization Approval    Authorization Effective Date: 7/12/2024  Authorization Expiration Date: 10/10/2025  Medication: LORazepam (ATIVAN) 0.5 MG tablet  Reference #:     Insurance Company: [employer] - Phone [phone] Fax [phone]  Which Pharmacy is filling the prescription (Not needed for infusion/clinic administered): [facility] - [address]  Pharmacy Notified: Yes  Patient Notified: Instructed pharmacy to notify patient when script is ready to /ship.

## 2024-10-15 ENCOUNTER — ASSISTED LIVING VISIT (OUTPATIENT)
Dept: GERIATRICS | Facility: CLINIC | Age: 79
End: 2024-10-15
Payer: COMMERCIAL

## 2024-10-15 VITALS
RESPIRATION RATE: 20 BRPM | TEMPERATURE: 97.2 F | HEART RATE: 74 BPM | BODY MASS INDEX: 25.2 KG/M2 | WEIGHT: 191 LBS | SYSTOLIC BLOOD PRESSURE: 122 MMHG | DIASTOLIC BLOOD PRESSURE: 74 MMHG

## 2024-10-15 DIAGNOSIS — F02.80 ALZHEIMER'S DISEASE (H): ICD-10-CM

## 2024-10-15 DIAGNOSIS — K92.2 GASTROINTESTINAL HEMORRHAGE, UNSPECIFIED GASTROINTESTINAL HEMORRHAGE TYPE: ICD-10-CM

## 2024-10-15 DIAGNOSIS — G30.9 ALZHEIMER'S DISEASE (H): ICD-10-CM

## 2024-10-15 DIAGNOSIS — D62 ANEMIA DUE TO BLOOD LOSS, ACUTE: Primary | ICD-10-CM

## 2024-10-15 DIAGNOSIS — F32.A DEPRESSION, UNSPECIFIED DEPRESSION TYPE: ICD-10-CM

## 2024-10-15 DIAGNOSIS — F41.9 ANXIETY: ICD-10-CM

## 2024-10-15 DIAGNOSIS — F03.918 DEMENTIA WITH AGGRESSIVE BEHAVIOR (H): ICD-10-CM

## 2024-10-15 DIAGNOSIS — I48.92 ATRIAL FLUTTER, UNSPECIFIED TYPE (H): ICD-10-CM

## 2024-10-15 PROCEDURE — 99349 HOME/RES VST EST MOD MDM 40: CPT | Performed by: NURSE PRACTITIONER

## 2024-10-15 NOTE — LETTER
10/15/2024      Young Tran  [address] [address]        [facility]    PRIMARY CARE PROVIDER AND CLINIC:  Juma Love, APRN [facility], [address] [address]  Chief Complaint   Patient presents with     Hospital F/U      [facility] Medical Record Number:  [id number]  Place of Service where encounter took place:  [facility] [[id number]]    Young Tran  is a 79 year old  (1945), returned to the above facility from  [facility]. Hospital stay 10/2/24 - 10/4/24.      HPI:    He has lived at this facility since 3/2024, initially in AL with his SO and moved to Memory Care 5/31/2024 following the death of his SO. We assumed care 5/2024.   Medical history significant for Alzheimer's disease, chronic afib,status post cardioversion 8/11/2023, hyperlipidemia, BPH, prostate cancer treated with radiation 12/2019, vitamin B12 deficiency.    He's followed by [facility].   Neuropsych testing in 2021 showed prominent amnestic impairment with semantic fluency loss and mild executive impairment. He was evaluated by Behavioral Neurology at [facility] 6/6/2023 for cognitive deficits for 4-5 yrs and diagnosed with Alzheimer's disease. MRI brain 6/15/2023 showed moderate fairly symmetric generalized atrophy, mild to moderate cerebrovascular. disease. He's been on donepezil and memantine in the past, both discontinued.     He was sent to the [facility] ED 10/2/2024 with dizziness, near syncope, bradycardia and hypotension. He was found to have Hgb 7.9, down from 11.8 on 7/11/2024. He had dried blood and red tinged stool on digital rectal exam. His daughter declined GI work up. EP consulted for concerns of sick sinus syndrome. He had recently completed a Zio patch, which showed 100% atrial flutter with HR  with rare ventricular ectopy. Metoprolol, digoxin and apixaban were dc'd due to dizziness,  syncope and GI bleed. He returned to the facility 10/4/2024 with Hgb 7.4.   Our on call provider was called 10/6/2024 after he had fallen during the night 10/5/2024 and had a syncopal episode 10/6/2024. SBP was in the 70-80s. His daughter declined hospitalization and requested urgent referral to hospice. Comfort meds were ordered per on call NP.   [facility] evaluated patient 10/7/2024 and did not feel he met criteria based on reported diagnoses of dementia and chronic afib.       He is unable to provide history. Pleasant, conversation is nonsensical. Very confused and looking for his parents. We walked around the unit together and he was visibly short of breath, though denied respiratory symptoms. Staff reports he continues to have episodes of dizziness and near falls. Also continues to be anxious with exit seeking.     CODE STATUS/ADVANCE DIRECTIVES DISCUSSION:  No CPR- Pre-arrest intubation OK  DNR only   ALLERGIES: No Known Allergies   PAST MEDICAL HISTORY:   Past Medical History:   Diagnosis Date     Alzheimer's disease (H)      Chronic a-fib (H)      Dupuytren contracture      Elevated prostate specific antigen (PSA)      History of colonic polyps      HLD (hyperlipidemia)      Prostate cancer (H)     radiation therapy 12/2019     Tear of lateral meniscus of left knee      Vitamin B12 deficiency (non anemic)      Vitamin D deficiency       PAST SURGICAL HISTORY:   has a past surgical history that includes hernia repair (Right, 1998); colonoscopy (2012); Colonoscopy (2009); colonoscopy (2007); Polypectomy; biopsy; and Wedge resection eyelid (Bilateral, 3/14/2024).  FAMILY HISTORY: family history includes Prostate Cancer in his brother and father.  SOCIAL HISTORY:   reports that he has never smoked. He has never used smokeless tobacco. He reports that he does not currently use alcohol. He reports that he does not use drugs.  Patient's living condition: lives in an assisted living facility    Post Discharge  Medication Reconciliation Status:   MED REC REQUIRED  Post Medication Reconciliation Status: discharge medications reconciled and changed, per note/orders       Current Outpatient Medications   Medication Sig Dispense Refill     cyanocobalamin (VITAMIN B-12) 1000 MCG tablet Take 1 tablet (1,000 mcg) by mouth daily 30 tablet 11     Elemental iron 65 mg Vitamin C 125 mg (VITRON C)  MG TABS tablet Take 1 tablet by mouth daily. 90 tablet 0     gabapentin (NEURONTIN) 100 MG capsule 100 mg po bid plus 100 mg po daily prn anxiety (Patient taking differently: Take 100 mg by mouth 2 times daily. 100 mg po bid plus 100 mg po daily prn anxiety) 60 capsule 11     HYDROmorphone (DILAUDID) 2 MG tablet Take 1 tablet (2 mg) by mouth every 2 hours as needed for pain. 10 tablet 0     hyoscyamine (LEVSIN/SL) 0.125 MG sublingual tablet Place 1 tablet (0.125 mg) under the tongue every 4 hours as needed for cramping. 10 tablet 0     LORazepam (ATIVAN) 0.5 MG tablet Take 1 tablet (0.5 mg) by mouth every 4 hours as needed for anxiety. 10 tablet 0     pantoprazole (PROTONIX) 40 MG EC tablet Take 1 tablet (40 mg) by mouth 2 times daily (before meals). 180 tablet 0     QUEtiapine (SEROQUEL) 25 MG tablet Take 0.5 tablets (12.5 mg) by mouth daily as needed (aggression/agitation). 15 tablet 11     sertraline (ZOLOFT) 50 MG tablet Take 1.5 tablets (75 mg) by mouth daily 60 tablet 11     simvastatin (ZOCOR) 20 MG tablet Take 1 tablet (20 mg) by mouth at bedtime 30 tablet 11     tamsulosin (FLOMAX) 0.4 MG capsule Take 1 capsule (0.4 mg) by mouth at bedtime 30 capsule 11     No current facility-administered medications for this visit.     ROS:  Unable to obtain due to dementia. Positives as noted under HPI.       Vitals:  /74   Pulse 74   Temp 97.2  F (36.2  C)   Resp 20   Wt 86.6 kg (191 lb)   BMI 25.20 kg/m    Exam:  GENERAL APPEARANCE:  Alert, in no distress  ENT:  [place]  EYES:  conjunctiva and lids normal  RESP:  lungs clear to  auscultation   CV:  irregular rate and rhythm, tachycardic, no murmur, no LE edema   ABDOMEN:  soft, non-tender, no distension  M/S:   gait unsteady. BEAN with good strength. No joint inflammation   SKIN:  no visible rashes or open areas   PSYCH:  oriented to self, insight and judgement impaired, memory impaired, slightly anxious     Lab/Diagnostic data:  Recent labs in [facility] reviewed by me today.       ASSESSMENT / PLAN:  (D62) Anemia due to blood loss, acute  (primary encounter diagnosis)  (K92.2) Gastrointestinal hemorrhage, unspecified gastrointestinal hemorrhage type  Comment: acute anemia with Hgb 7.4 and evidence of GI bleed. Daughter declined GI work up.  No signs of active bleeding noted by staff.   Plan: continue PPI, iron-vitamin C.   Met with the nurse from [facility] and reviewed hospital stay, diagnoses of acute anemia and atrial flutter, advanced dementia with cognitive and functional decline. She feels that he will meet criteria and will request re-evaluation. Hospice will contact patient's daughter.   Discussed with facility staff.     (I48.92) Atrial flutter, unspecified type (H)  Comment: irregular with HR >110 on exam. Likely contributing to his dizziness and falls. 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 Significant behavior issues, including agitation and almost constant exit seeking. Can be aggressive with attempts to keep him on the unit. Seroquel prn and lorazepam prn have not been used.   Plan: continue sertraline, gabapentin scheduled and prn, seroquel prn. Encouraged staff to use seroquel prn or lorazepam prn for severe agitation and aggression.       Electronically signed by:  DASH Isabel CNP                     Sincerely,        Juma  DASH Tomlin CNP

## 2024-10-15 NOTE — PROGRESS NOTES
[facility] GERIATRICS    PRIMARY CARE PROVIDER AND CLINIC:  Juma Love, APRN CNP, [address] [facility] [address]  Chief Complaint   Patient presents with    Hospital F/U      [facility] Medical Record Number:  [id number]  Place of Service where encounter took place:  [facility] ([facility]) [[id number]]    Young Tran  is a 79 year old  (1945), returned to the above facility from  [facility]. Hospital stay 10/2/24 - 10/4/24.      HPI:    He has lived at this facility since 3/2024, initially in [place] with his SO and moved to Memory Care 5/31/2024 following the death of his SO. We assumed care 5/2024.   Medical history significant for Alzheimer's disease, chronic afib,status post cardioversion 8/11/2023, hyperlipidemia, BPH, prostate cancer treated with radiation 12/2019, vitamin B12 deficiency.    He's followed by [facility].   Neuropsych testing in 2021 showed prominent amnestic impairment with semantic fluency loss and mild executive impairment. He was evaluated by Behavioral Neurology at [facility] 6/6/2023 for cognitive deficits for 4-5 yrs and diagnosed with Alzheimer's disease. MRI brain 6/15/2023 showed moderate fairly symmetric generalized atrophy, mild to moderate cerebrovascular. disease. He's been on donepezil and memantine in the past, both discontinued.     He was sent to the [facility] ED 10/2/2024 with dizziness, near syncope, bradycardia and hypotension. He was found to have Hgb 7.9, down from 11.8 on 7/11/2024. He had dried blood and red tinged stool on digital rectal exam. His daughter declined GI work up. EP consulted for concerns of sick sinus syndrome. He had recently completed a Zio patch, which showed 100% atrial flutter with HR  with rare ventricular ectopy. Metoprolol, digoxin and apixaban were dc'd due to dizziness, syncope and GI bleed. He returned to the facility 10/4/2024 with Hgb 7.4.    Our on call provider was called 10/6/2024 after he had fallen during the night 10/5/2024 and had a syncopal episode 10/6/2024. SBP was in the 70-80s. His daughter declined hospitalization and requested urgent referral to hospice. Comfort meds were ordered per on call NP.   [facility] evaluated patient 10/7/2024 and did not feel he met criteria based on reported diagnoses of dementia and chronic afib.       He is unable to provide history. Pleasant, conversation is nonsensical. Very confused and looking for his parents. We walked around the unit together and he was visibly short of breath, though denied respiratory symptoms. Staff reports he continues to have episodes of dizziness and near falls. Also continues to be anxious with exit seeking.     CODE STATUS/ADVANCE DIRECTIVES DISCUSSION:  No CPR- Pre-arrest intubation OK  DNR only   ALLERGIES: No Known Allergies   PAST MEDICAL HISTORY:   Past Medical History:   Diagnosis Date    Alzheimer's disease (H)     Chronic a-fib (H)     Dupuytren contracture     Elevated prostate specific antigen (PSA)     History of colonic polyps     HLD (hyperlipidemia)     Prostate cancer (H)     radiation therapy 12/2019    Tear of lateral meniscus of left knee     Vitamin B12 deficiency (non anemic)     Vitamin D deficiency       PAST SURGICAL HISTORY:   has a past surgical history that includes hernia repair (Right, 1998); colonoscopy (2012); Colonoscopy (2009); colonoscopy (2007); Polypectomy; biopsy; and Wedge resection eyelid (Bilateral, 3/14/2024).  FAMILY HISTORY: family history includes Prostate Cancer in his brother and father.  SOCIAL HISTORY:   reports that he has never smoked. He has never used smokeless tobacco. He reports that he does not currently use alcohol. He reports that he does not use drugs.  Patient's living condition: lives in an assisted living facility    Post Discharge Medication Reconciliation Status:   MED REC REQUIRED  Post Medication Reconciliation  Status: discharge medications reconciled and changed, per note/orders       Current Outpatient Medications   Medication Sig Dispense Refill    cyanocobalamin (VITAMIN B-12) 1000 MCG tablet Take 1 tablet (1,000 mcg) by mouth daily 30 tablet 11    Elemental iron 65 mg Vitamin C 125 mg (VITRON C)  MG TABS tablet Take 1 tablet by mouth daily. 90 tablet 0    gabapentin (NEURONTIN) 100 MG capsule 100 mg po bid plus 100 mg po daily prn anxiety (Patient taking differently: Take 100 mg by mouth 2 times daily. 100 mg po bid plus 100 mg po daily prn anxiety) 60 capsule 11    HYDROmorphone (DILAUDID) 2 MG tablet Take 1 tablet (2 mg) by mouth every 2 hours as needed for pain. 10 tablet 0    hyoscyamine (LEVSIN/SL) 0.125 MG sublingual tablet Place 1 tablet (0.125 mg) under the tongue every 4 hours as needed for cramping. 10 tablet 0    LORazepam (ATIVAN) 0.5 MG tablet Take 1 tablet (0.5 mg) by mouth every 4 hours as needed for anxiety. 10 tablet 0    pantoprazole (PROTONIX) 40 MG EC tablet Take 1 tablet (40 mg) by mouth 2 times daily (before meals). 180 tablet 0    QUEtiapine (SEROQUEL) 25 MG tablet Take 0.5 tablets (12.5 mg) by mouth daily as needed (aggression/agitation). 15 tablet 11    sertraline (ZOLOFT) 50 MG tablet Take 1.5 tablets (75 mg) by mouth daily 60 tablet 11    simvastatin (ZOCOR) 20 MG tablet Take 1 tablet (20 mg) by mouth at bedtime 30 tablet 11    tamsulosin (FLOMAX) 0.4 MG capsule Take 1 capsule (0.4 mg) by mouth at bedtime 30 capsule 11     No current facility-administered medications for this visit.     ROS:  Unable to obtain due to dementia. Positives as noted under HPI.       Vitals:  /74   Pulse 74   Temp 97.2  F (36.2  C)   Resp 20   Wt 86.6 kg (191 lb)   BMI 25.20 kg/m    Exam:  GENERAL APPEARANCE:  Alert, in no distress  ENT:  [place]  EYES:  conjunctiva and lids normal  RESP:  lungs clear to auscultation   CV:  irregular rate and rhythm, tachycardic, no murmur, no LE edema   ABDOMEN:   soft, non-tender, no distension  M/S:   gait unsteady. BEAN with good strength. No joint inflammation   SKIN:  no visible rashes or open areas   PSYCH:  oriented to self, insight and judgement impaired, memory impaired, slightly anxious     Lab/Diagnostic data:  Recent labs in [facility] reviewed by me today.       ASSESSMENT / PLAN:  (D62) Anemia due to blood loss, acute  (primary encounter diagnosis)  (K92.2) Gastrointestinal hemorrhage, unspecified gastrointestinal hemorrhage type  Comment: acute anemia with Hgb 7.4 and evidence of GI bleed. Daughter declined GI work up.  No signs of active bleeding noted by staff.   Plan: continue PPI, iron-vitamin C.   Met with the nurse from [facility] and reviewed hospital stay, diagnoses of acute anemia and atrial flutter, advanced dementia with cognitive and functional decline. She feels that he will meet criteria and will request re-evaluation. Hospice will contact patient's daughter.   Discussed with facility staff.     (I48.92) Atrial flutter, unspecified type (H)  Comment: irregular with HR >110 on exam. Likely contributing to his dizziness and falls. 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 Significant behavior issues, including agitation and almost constant exit seeking. Can be aggressive with attempts to keep him on the unit. Seroquel prn and lorazepam prn have not been used.   Plan: continue sertraline, gabapentin scheduled and prn, seroquel prn. Encouraged staff to use seroquel prn or lorazepam prn for severe agitation and aggression.       Electronically signed by:  DASH Isabel CNP

## 2024-10-18 ENCOUNTER — TELEPHONE (OUTPATIENT)
Dept: GERIATRICS | Facility: CLINIC | Age: 79
End: 2024-10-18
Payer: COMMERCIAL

## 2024-10-18 DIAGNOSIS — F03.918 DEMENTIA WITH AGGRESSIVE BEHAVIOR (H): ICD-10-CM

## 2024-10-18 DIAGNOSIS — G30.9 ALZHEIMER'S DISEASE (H): ICD-10-CM

## 2024-10-18 DIAGNOSIS — F02.80 ALZHEIMER'S DISEASE (H): ICD-10-CM

## 2024-10-18 RX ORDER — QUETIAPINE FUMARATE 25 MG/1
12.5 TABLET, FILM COATED ORAL AT BEDTIME
Qty: 15 TABLET | Refills: 11 | Status: SHIPPED | OUTPATIENT
Start: 2024-10-18

## 2024-10-18 NOTE — TELEPHONE ENCOUNTER
Spoke with the nurse on Memory Care. Patient had a fall 10/17/2024, no injury.   He got off the unit on 10/16/2024, became angry and agitated with attempts to return to the unit. He spent much of the day 10/17/2024 by the door attempting to leave. Became very angry, agitated and somewhat aggressive with attempts to move him away from the door. Frightened visitors and other residents. Staff has not used prn seroquel, prn lorazepam was given X 1 on 10/15/2024 for agitation with some improvement.     Order sent to facility and pharmacy  for seroquel 12.5 mg at HS and 12.5 mg daily prn.   Continue gabapentin, lorazepam prn as ordered.   He is scheduled to enroll with [facility] 10/21/2024.     DASH Isabel CNP on 10/18/2024 at 12:17 PM

## 2024-10-18 NOTE — TELEPHONE ENCOUNTER
Young ZIMMERMAN 1945    Orders 10/18/2024:  Seroquel 12.5 mg po HS and 12.5 mg po daily prn dementia with agitation and aggression  Sent to pharmacy    DASH Isabel CNP on 10/18/2024 at 12:20 PM

## 2024-10-22 ENCOUNTER — DOCUMENTATION ONLY (OUTPATIENT)
Dept: OTHER | Facility: CLINIC | Age: 79
End: 2024-10-22
Payer: COMMERCIAL

## 2024-12-11 NOTE — PROGRESS NOTES
[facility] GERIATRICS    Chief Complaint   Patient presents with    RECHECK     HPI:  Young Tran is a 79 year old  (1945), who is being seen today for an episodic care visit at: [facility]) [[id number]]. Today's concern is: ***    Allergies, and PMH/PSH reviewed in EPIC today.  REVIEW OF SYSTEMS:  {bpnbka17:322233}    Objective:   BP 90/50   Pulse (!) 128   Temp 97.9  F (36.6  C)   Resp 18   Wt 84.6 kg (186 lb 6.4 oz)   BMI 24.59 kg/m    {Nursing home physical exam :766711}    {fgslab:134833}    Assessment/Plan:  {S DX2:065769}    MED REC REQUIRED{TIP  Click the link below to document or use med rec list, use list to pull in response :982199}  Post Medication Reconciliation Status: {MED REC LIST:007545}      Orders:  {fgsorders:630417}  ***    Electronically signed by: Jarrod Small ***       weakness  Alzheimer's disease (H)  Dementia with aggressive behavior (H)  Chronic atrial fibrillation (H)  Atrial flutter, unspecified type (H)  Anemia due to blood loss, acute  Gastrointestinal hemorrhage, unspecified gastrointestinal hemorrhage type  Hospice care patient  He is seen for follow up after the nurse called 12/11/2024 reporting acute onset left facial droop, left sided weakness, slurred speech and a fall. /60 and HR 92 at the time.  Symptoms appeared improved yesterday and he was up ad deon per usual. He is unable to provide history. Pleasant and talkative, though mostly nonsensical. Staff reports he continues to exit seek and is anxious at times, but no recent aggression.     Allergies, and PMH/PSH reviewed in EPIC today    REVIEW OF SYSTEMS:  Unable to obtain due to dementia. Positives as noted under HPI.       Objective:   BP 90/50   Pulse (!) 128   Temp 97.9  F (36.6  C)   Resp 18   Wt 84.6 kg (186 lb 6.4 oz)   BMI 24.59 kg/m    GENERAL APPEARANCE:  Alert, in no distress, appears pale   ENT:  [place]  EYES:  conjunctiva and lids normal  RESP:  lungs clear to auscultation   CV:  irregular rate and rhythm, tachycardic, no murmur, no LE edema   ABDOMEN:  soft, non-tender, no distension  M/S:   up in chair. BEAN with good strength. Left hand grasp slightly less than right. No joint inflammation   SKIN:  no visible rashes or open areas   PSYCH:  oriented to self, insight and judgement impaired, memory impaired, affect normal     Recent labs in [facility] reviewed by me today.         ASSESSMENT / PLAN:  (R53.1) Acute left-sided weakness  (primary encounter diagnosis)  Comment: new onset left sided weakness, slurred speech and left  facial droop yesterday, resolved today. Probable TIA  Discussed with his daughter Peg Hernandez, who lives out of state   Plan: monitor symptoms. Comfort care with hospice.     (G30.9,  F02.80) Alzheimer's disease (H)  (F03.918) Dementia with aggressive behavior (H)  Comment:  severe deficits. Aggression and exit seeking have improved with current med regimen.   Plan: continue gabapentin, seroquel, sertraline. Adjust meds as needed. Memory Care staff assistance with cares, meals, activities, med admin    (I48.20) Chronic atrial fibrillation (H)  (I48.92) Atrial flutter, unspecified type (H)  Comment: irregular and tachy on exam   Apixaban, metoprolol, digoxin dc'd while inpatient with GI bleed.   Plan: monitor symptoms     (D62) Anemia due to blood loss, acute  (K92.2) Gastrointestinal hemorrhage, unspecified gastrointestinal hemorrhage type  Comment: appears pale, no s/s of active bleeding   Plan: continue PPI bid, iron-vitamin C. No need for follow up labs given goals of care.     (Z51.5) Hospice care patient  Comment: comfort meds and cares with [facility]         Electronically signed by: DASH Isabel CNP

## 2024-12-12 ENCOUNTER — ASSISTED LIVING VISIT (OUTPATIENT)
Dept: GERIATRICS | Facility: CLINIC | Age: 79
End: 2024-12-12
Payer: COMMERCIAL

## 2024-12-12 VITALS
HEART RATE: 128 BPM | SYSTOLIC BLOOD PRESSURE: 90 MMHG | BODY MASS INDEX: 24.59 KG/M2 | TEMPERATURE: 97.9 F | WEIGHT: 186.4 LBS | DIASTOLIC BLOOD PRESSURE: 50 MMHG | RESPIRATION RATE: 18 BRPM

## 2024-12-12 DIAGNOSIS — Z51.5 HOSPICE CARE PATIENT: ICD-10-CM

## 2024-12-12 DIAGNOSIS — I48.20 CHRONIC ATRIAL FIBRILLATION (H): ICD-10-CM

## 2024-12-12 DIAGNOSIS — G30.9 ALZHEIMER'S DISEASE (H): ICD-10-CM

## 2024-12-12 DIAGNOSIS — R53.1 ACUTE LEFT-SIDED WEAKNESS: Primary | ICD-10-CM

## 2024-12-12 DIAGNOSIS — I48.92 ATRIAL FLUTTER, UNSPECIFIED TYPE (H): ICD-10-CM

## 2024-12-12 DIAGNOSIS — F02.80 ALZHEIMER'S DISEASE (H): ICD-10-CM

## 2024-12-12 DIAGNOSIS — F03.918 DEMENTIA WITH AGGRESSIVE BEHAVIOR (H): ICD-10-CM

## 2024-12-12 DIAGNOSIS — K92.2 GASTROINTESTINAL HEMORRHAGE, UNSPECIFIED GASTROINTESTINAL HEMORRHAGE TYPE: ICD-10-CM

## 2024-12-12 DIAGNOSIS — D62 ANEMIA DUE TO BLOOD LOSS, ACUTE: ICD-10-CM

## 2024-12-12 PROCEDURE — 99349 HOME/RES VST EST MOD MDM 40: CPT | Mod: GV | Performed by: NURSE PRACTITIONER

## 2024-12-12 NOTE — LETTER
12/12/2024      Young Trna  [address] [address]        No notes on file      Sincerely,        DASH Isabel CNP

## 2024-12-19 DIAGNOSIS — D64.9 NORMOCYTIC ANEMIA: ICD-10-CM

## 2024-12-19 RX ORDER — BACILLUS COAGULANS 1B CELL
1 CAPSULE ORAL DAILY
Qty: 31 TABLET | Refills: 11 | Status: SHIPPED | OUTPATIENT
Start: 2024-12-19

## 2024-12-29 ENCOUNTER — HEALTH MAINTENANCE LETTER (OUTPATIENT)
Age: 79
End: 2024-12-29

## 2025-02-18 ENCOUNTER — ASSISTED LIVING VISIT (OUTPATIENT)
Dept: GERIATRICS | Facility: CLINIC | Age: 80
End: 2025-02-18
Payer: COMMERCIAL

## 2025-02-18 VITALS
RESPIRATION RATE: 18 BRPM | SYSTOLIC BLOOD PRESSURE: 112 MMHG | TEMPERATURE: 97 F | BODY MASS INDEX: 24.88 KG/M2 | WEIGHT: 188.6 LBS | DIASTOLIC BLOOD PRESSURE: 77 MMHG | HEART RATE: 100 BPM

## 2025-02-18 DIAGNOSIS — Z51.5 HOSPICE CARE PATIENT: ICD-10-CM

## 2025-02-18 DIAGNOSIS — I48.92 ATRIAL FLUTTER, UNSPECIFIED TYPE (H): ICD-10-CM

## 2025-02-18 DIAGNOSIS — I48.20 CHRONIC ATRIAL FIBRILLATION (H): ICD-10-CM

## 2025-02-18 DIAGNOSIS — K92.2 GASTROINTESTINAL HEMORRHAGE, UNSPECIFIED GASTROINTESTINAL HEMORRHAGE TYPE: ICD-10-CM

## 2025-02-18 DIAGNOSIS — F02.80 ALZHEIMER'S DISEASE (H): Primary | ICD-10-CM

## 2025-02-18 DIAGNOSIS — G30.9 ALZHEIMER'S DISEASE (H): Primary | ICD-10-CM

## 2025-02-18 DIAGNOSIS — F32.A DEPRESSION, UNSPECIFIED DEPRESSION TYPE: ICD-10-CM

## 2025-02-18 DIAGNOSIS — F41.9 ANXIETY: ICD-10-CM

## 2025-02-18 DIAGNOSIS — F03.918 DEMENTIA WITH AGGRESSIVE BEHAVIOR (H): ICD-10-CM

## 2025-02-18 PROCEDURE — 99349 HOME/RES VST EST MOD MDM 40: CPT | Mod: GV | Performed by: NURSE PRACTITIONER

## 2025-02-18 NOTE — PROGRESS NOTES
[facility] GERIATRICS    Chief Complaint   Patient presents with    RECHECK     HPI:  Young Tran is a 79 year old  (1945), who is being seen today for an episodic care visit at: [facility]) [[id number]]. Today's concern is: ***    Allergies, and PMH/PSH reviewed in EPIC today.  REVIEW OF SYSTEMS:  {uotbbj09:059100}    Objective:   /77   Pulse 100   Temp 97  F (36.1  C)   Resp 18   Wt 85.5 kg (188 lb 9.6 oz)   BMI 24.88 kg/m    {Nursing home physical exam :561662}    {fgslab:456302}    Assessment/Plan:  {FGS DX2:509883}    MED REC REQUIRED{TIP  Click the link below to document or use med rec list, use list to pull in response :246018}  Post Medication Reconciliation Status: {MED REC LIST:916781}      Orders:  {fgsorders:597524}  ***    Electronically signed by: Jarrod Small ***       speech and a fall on 12/11/2024. Symptoms resolved in less than 24 hrs.       Today's concern is:      Alzheimer's disease (H)  Dementia with aggressive behavior (H)  Chronic atrial fibrillation (H)  Atrial flutter, unspecified type (H)  Acute left-sided weakness  Gastrointestinal hemorrhage, unspecified gastrointestinal hemorrhage type  Depression, unspecified depression type  Anxiety  Hospice care patient  He is seen for follow up of multiple medical issues. His daughter Peg Hernandez is here from [place]. Patient is unable to provide history. Up and about in his apartment, conversation is nonsensical. He seems to recognize is daughter, but it's unclear. Requires assist of 1 with cares. Staff reports he continues to be anxious at times with exit seeking, but episodes are less frequent.     Allergies, and PMH/PSH reviewed in EPIC today    REVIEW OF SYSTEMS:  Unable to obtain due to dementia. Positives as noted under HPI.       Objective:   /77   Pulse 100   Temp 97  F (36.1  C)   Resp 18   Wt 85.5 kg (188 lb 9.6 oz)   BMI 24.88 kg/m    GENERAL APPEARANCE:  Alert, in no distress   ENT:  [place]  EYES:  conjunctiva and lids normal  RESP:  lungs clear to auscultation   CV:  irregular rate and rhythm,  tachy, no murmur, no LE edema   ABDOMEN:  soft, non-tender, no distension  M/S:   gait steady, BEAN with good strength. No joint inflammation   SKIN:  no visible rashes or open areas   PSYCH:  oriented to self, ?daughter, insight and judgement impaired, memory impaired, affect normal     Recent labs in [facility] reviewed by me today.       ASSESSMENT / PLAN:  (G30.9,  F02.80) Alzheimer's disease (H)  (primary encounter diagnosis)  (F03.918) Dementia with aggressive behavior (H)  Comment: severe deficits. Aggression improved with current meds   Plan: continue gabapentin 100 mg bid and daily prn, sertraline 75 mg daily, seroquel 6.25 mg bid and 12.5 mg HS. Memory Care staff assistance with cares, meals, activities, med  admin  Discussed with staff     (I48.20) Chronic atrial fibrillation (H)  (I48.92) Atrial flutter, unspecified type (H)  Comment: slightly tachy today at 100-110. Appears asymptomatic   Apixaban, metoprolol, digoxin dc'd due to GI bleed, anemia, near syncope   Probable TIA 12/11/2024 with acute left sided weakness, facial droop and slurred speech.   Plan: monitor symptoms     (K92.2) Gastrointestinal hemorrhage, unspecified gastrointestinal hemorrhage type  Comment: no s/s of active bleeding. His color has improved   Plan: continue iron-vitamin C. No need for labs given goals of care     (F32.A) Depression, unspecified depression type  (F41.9) Anxiety  Comment: fairly well managed   Plan: meds as above     (Z51.5) Hospice care patient  Comment: comfort meds and cares with [facility]           Electronically signed by: DASH Isabel CNP

## 2025-02-18 NOTE — LETTER
2/18/2025      Young Tran  [address] [place] [address]        No notes on file      Sincerely,        DASH Isabel CNP    Electronically signed

## 2025-05-20 ENCOUNTER — ASSISTED LIVING VISIT (OUTPATIENT)
Dept: GERIATRICS | Facility: CLINIC | Age: 80
End: 2025-05-20
Payer: COMMERCIAL

## 2025-05-20 VITALS
BODY MASS INDEX: 25.46 KG/M2 | WEIGHT: 193 LBS | SYSTOLIC BLOOD PRESSURE: 126 MMHG | HEART RATE: 121 BPM | RESPIRATION RATE: 20 BRPM | DIASTOLIC BLOOD PRESSURE: 70 MMHG | TEMPERATURE: 97.7 F

## 2025-05-20 NOTE — PROGRESS NOTES
[facility] GERIATRICS    Chief Complaint   Patient presents with    RECHECK     HPI:  Young Tran is a 79 year old  (1945), who is being seen today for an episodic care visit at: [facility]) [[id number]]. Today's concern is: ***    Allergies, and PMH/PSH reviewed in EPIC today.  REVIEW OF SYSTEMS:  {pqzqla73:403365}    Objective:   /70   Pulse (!) 121   Temp 97.7  F (36.5  C)   Resp 20   Wt 87.5 kg (193 lb)   BMI 25.46 kg/m    {Nursing home physical exam :523939}    {fgslab:932157}    Assessment/Plan:  {FGS DX2:237624}    MED REC REQUIRED{TIP  Click the link below to document or use med rec list, use list to pull in response :331154}  Post Medication Reconciliation Status: {MED REC LIST:955695}      Orders:  {fgsorders:328957}  ***    Electronically signed by: Jarrod Small ***

## 2025-07-14 ENCOUNTER — ASSISTED LIVING VISIT (OUTPATIENT)
Dept: GERIATRICS | Facility: CLINIC | Age: 80
End: 2025-07-14
Payer: COMMERCIAL

## 2025-07-14 VITALS
WEIGHT: 197.2 LBS | BODY MASS INDEX: 26.02 KG/M2 | RESPIRATION RATE: 24 BRPM | HEART RATE: 110 BPM | DIASTOLIC BLOOD PRESSURE: 89 MMHG | SYSTOLIC BLOOD PRESSURE: 113 MMHG | TEMPERATURE: 97.8 F

## 2025-07-14 DIAGNOSIS — I48.20 CHRONIC ATRIAL FIBRILLATION (H): ICD-10-CM

## 2025-07-14 DIAGNOSIS — F03.918 DEMENTIA WITH AGGRESSIVE BEHAVIOR (H): ICD-10-CM

## 2025-07-14 DIAGNOSIS — F02.80 ALZHEIMER'S DISEASE (H): Primary | ICD-10-CM

## 2025-07-14 DIAGNOSIS — G30.9 ALZHEIMER'S DISEASE (H): Primary | ICD-10-CM

## 2025-07-14 PROCEDURE — 99349 HOME/RES VST EST MOD MDM 40: CPT | Mod: 25 | Performed by: NURSE PRACTITIONER

## 2025-07-14 PROCEDURE — G0439 PPPS, SUBSEQ VISIT: HCPCS | Performed by: NURSE PRACTITIONER

## 2025-07-14 RX ORDER — ACETAMINOPHEN 500 MG
500 TABLET ORAL EVERY 6 HOURS PRN
COMMUNITY

## 2025-07-14 NOTE — LETTER
7/14/2025      Young Tran  [address] [facility] [address]        Preventive Care Visit  [facility] GERIATRIC SERVICES  DASH Armstrong CNP, Nurse Practitioner - Gerontology  Jul 14, 2025      ASSESSMENT / PLAN:  (G30.9,  F02.80) Alzheimer's disease (H)  (primary encounter diagnosis)  (F03.918) Dementia with aggressive behavior (H)  Comment: severe deficits. Aggression and agitation well managed   Plan: continue sertraline, seroquel, gabapentin. Memory Care staff assistance with cares, meals, activities, med admin  Goals of care are comfort focused, avoid hospitalization, hospice if his status declines.     (I48.20) Chronic atrial fibrillation (H)  Comment: apixaban, metoprolol, digoxin were dc'd 10/2024 due to GI bleed, anemia, near syncope. Not restarted due to goals of care  Plan: monitor       Subjective  Young is a 79 year old, presenting for the following:  Wellness Visit         HPI   He has lived at this facility since 3/2024, initially in [place] with his SO and moved to Memory Care 5/31/2024 following the death of his SO. We assumed care 5/2024.   Medical history significant for Alzheimer's disease, chronic afib,status post cardioversion 8/11/2023, hyperlipidemia, BPH, prostate cancer treated with radiation 12/2019, vitamin B12 deficiency.    He's followed by [facility].   Neuropsych testing in 2021 showed prominent amnestic impairment with semantic fluency loss and mild executive impairment. He was evaluated by Behavioral Neurology at [facility] 6/6/2023 and diagnosed with Alzheimer's disease. MRI brain 6/15/2023 showed moderate fairly symmetric generalized atrophy, mild to moderate cerebrovascular. disease. He's been on donepezil and memantine in the past, both discontinued.   He was sent to the [facility] ED 10/2/2024 with dizziness, near syncope, bradycardia and hypotension. He was found to have Hgb 7.9, down from 11.8 on 7/11/2024. He had dried blood and red tinged stool on  digital rectal exam. His daughter declined GI work up. EP consulted for concerns of sick sinus syndrome. He had recently completed a Zio patch, which showed 100% atrial flutter with HR  with rare ventricular ectopy. Metoprolol, digoxin and apixaban were dc'd due to dizziness, syncope and GI bleed. He returned to the facility 10/4/2024 with Hgb 7.4.   He fell during the night 10/5/2024 and had a syncopal episode 10/6/2024. SBP was in the 70-80s. His daughter declined hospitalization and requested comfort care. He enrolled with [facility] 10/21/2024.   He had acute left sided weakness, left facial droop, slurred speech and a fall on 12/11/2024. Symptoms resolved in less than 24 hrs.   He discharged from hospice 3/5/2025 due to stability.     Annual Wellness Visit     Patient has been advised of split billing requirements and indicates understanding: No        Health Care Directive    Document on file is a Health Care Directive or POLST.  In general, how would you rate your overall physical health? Unable to answer due to dementia   Do you have a special diet?  Regular (no restrictions)        7/14/2025   Exercise, Social Connection, Stress   Days per week of moderate/strenous exercise Pt unable to answer due to dementia    Average minutes spent exercising at this level Pt Unable   Frequency of gathering with friends or relatives Pt Unable   Feel stress (tense, anxious, or unable to sleep) Pt Unable     Do you see a dentist two times every year?  Unable to go out for appts   Have you been more tired than usual lately?  Unable to answer due to dementia   If you drink alcohol do you typically have >3 drinks per day or >7 drinks per week? Does not drink alcohol   Do you have a current opioid prescription? No  Do you use any other controlled substances or medications that are not prescribed by a provider? None  Social History     Tobacco Use     Smoking status: Never     Smokeless tobacco: Never   Substance Use  Topics     Alcohol use: Not Currently     Comment: 1-2 drinks per week     Drug use: Never       Needs assistance for the following daily activities: Lives on Memory Care and requires assistance with all ADLs  Which of the following safety concerns are present in your home?  none identified   Do you (or your family members) have any concerns about your safety while driving?  No, does not drive   Do you have any of the following hearing concerns?: No hearing concerns  In the past 6 months, have you been bothered by leaking of urine? No            7/14/2025   Fall Risk   Fallen 2 or more times in the past year? Yes   Trouble with walking or balance? Yes   Reason Gait Speed Test Not Completed Patient does not comprehend instructions   Reason for decline Dementia        ASCVD Risk   The ASCVD Risk score (Sai CARLISLE, et al., 2019) failed to calculate for the following reasons:    Cannot find a previous HDL lab    Cannot find a previous total cholesterol lab        Reviewed and updated as needed this visit by Provider   Tobacco  Allergies  Meds  Problems  Med Hx  Surg Hx  Fam Hx     Sexual Activity        Appropriate preventive services were discussed with this patient, including applicable screening as appropriate for fall prevention, nutrition, physical activity, Tobacco-use cessation, weight loss and cognition.  Checklist reviewing preventive services not given to patient due to dementia.             7/14/2025   Mini Cog   Mini-Cog Not Completed (choose reason) Known dementia             Today's PHQ-2 Score:       Unable to answer due to dementia                         Past Medical History:   Diagnosis Date     Alzheimer's disease (H)      Chronic a-fib (H)      Dupuytren contracture      Elevated prostate specific antigen (PSA)      History of colonic polyps      HLD (hyperlipidemia)      Prostate cancer (H)     radiation therapy 12/2019     Tear of lateral meniscus of left knee      Vitamin B12  "deficiency (non anemic)      Vitamin D deficiency      Current providers sharing in care for this patient include:  Patient Care Team:  Juma Love APRN CNP as PCP - General (Nurse Practitioner - Gerontology)  Jarrod Small as Nursing Assistant  Peg Moeller MD as MD (Internal Medicine)  Tatiana (Fgs), [facility]  Juma Love APRN CNP as Assigned PCP    The following health maintenance items are reviewed in Epic and correct as of today:  Health Maintenance   Topic Date Due     LIPID  Never done     HEPATITIS C SCREENING  Never done     ZOSTER VACCINE (3 of 3) 09/09/2012     DTAP/TDAP/TD VACCINE (3 - Td or Tdap) 05/29/2024     COVID-19 VACCINE (7 - 2024-25 season) 09/01/2024     MEDICARE ANNUAL WELLNESS VISIT  11/03/2024     PHQ-2 (once per calendar year)  Never done     INFLUENZA VACCINE (1) 09/01/2025     FALL RISK ASSESSMENT  07/14/2026     DIABETES SCREENING  10/04/2027     ADVANCE CARE PLANNING  10/22/2029     PNEUMOCOCCAL VACCINE 50+ YEARS  Completed     RSV VACCINE  Completed     HPV VACCINE  Aged Out     MENINGITIS VACCINE  Aged Out     COLORECTAL CANCER SCREENING  Discontinued         Review of Systems  Unable to obtain due to dementia. Up ad deon without a device. Requires assist of 1 with cares. Staff reports no new issues.        Objective   Exam  /89   Pulse 110   Temp 97.8  F (36.6  C)   Resp 24   Wt 89.4 kg (197 lb 3.2 oz)   BMI 26.02 kg/m     Estimated body mass index is 26.02 kg/m  as calculated from the following:    Height as of 7/2/24: 1.854 m (6' 1\").    Weight as of this encounter: 89.4 kg (197 lb 3.2 oz).  GENERAL APPEARANCE:  Alert, in no distress   ENT:  [place]  EYES:  conjunctiva and lids normal  RESP:  lungs clear to auscultation   CV:  irregular rate and rhythm,  tachy, no murmur, no LE edema   ABDOMEN:  soft, non-tender, no distension  M/S:   gait steady, BEAN with good strength. No joint inflammation   SKIN:  no visible rashes or open areas "   PSYCH:  oriented to self, insight and judgement impaired, memory impaired, affect normal          Signed Electronically by: DASH Armstrong CNP        Sincerely,        DASH Armstrong CNP    Electronically signed

## 2025-07-14 NOTE — PROGRESS NOTES
Preventive Care Visit  [facility] GERIATRIC SERVICES  DASH Armstrong CNP, Nurse Practitioner - Gerontology  Jul 14, 2025      ASSESSMENT / PLAN:  (G30.9,  F02.80) Alzheimer's disease (H)  (primary encounter diagnosis)  (F03.918) Dementia with aggressive behavior (H)  Comment: severe deficits. Aggression and agitation well managed   Plan: continue sertraline, seroquel, gabapentin. Memory Care staff assistance with cares, meals, activities, med admin  Goals of care are comfort focused, avoid hospitalization, hospice if his status declines.     (I48.20) Chronic atrial fibrillation (H)  Comment: apixaban, metoprolol, digoxin were dc'd 10/2024 due to GI bleed, anemia, near syncope. Not restarted due to goals of care  Plan: monitor       Subjective   Young is a 79 year old, presenting for the following:  Wellness Visit         HPI   He has lived at this facility since 3/2024, initially in [place] with his SO and moved to Memory Care 5/31/2024 following the death of his SO. We assumed care 5/2024.   Medical history significant for Alzheimer's disease, chronic afib,status post cardioversion 8/11/2023, hyperlipidemia, BPH, prostate cancer treated with radiation 12/2019, vitamin B12 deficiency.    He's followed by [facility] [place] [facility].   Neuropsych testing in 2021 showed prominent amnestic impairment with semantic fluency loss and mild executive impairment. He was evaluated by Behavioral Neurology at [facility] 6/6/2023 and diagnosed with Alzheimer's disease. MRI brain 6/15/2023 showed moderate fairly symmetric generalized atrophy, mild to moderate cerebrovascular. disease. He's been on donepezil and memantine in the past, both discontinued.   He was sent to the [facility] ED 10/2/2024 with dizziness, near syncope, bradycardia and hypotension. He was found to have Hgb 7.9, down from 11.8 on 7/11/2024. He had dried blood and red tinged stool on digital rectal exam. His daughter declined GI work up. EP consulted for  concerns of sick sinus syndrome. He had recently completed a Zio patch, which showed 100% atrial flutter with HR  with rare ventricular ectopy. Metoprolol, digoxin and apixaban were dc'd due to dizziness, syncope and GI bleed. He returned to the facility 10/4/2024 with Hgb 7.4.   He fell during the night 10/5/2024 and had a syncopal episode 10/6/2024. SBP was in the 70-80s. His daughter declined hospitalization and requested comfort care. He enrolled with [facility] 10/21/2024.   He had acute left sided weakness, left facial droop, slurred speech and a fall on 12/11/2024. Symptoms resolved in less than 24 hrs.   He discharged from hospice 3/5/2025 due to stability.     Annual Wellness Visit     Patient has been advised of split billing requirements and indicates understanding: No        Health Care Directive    Document on file is a Health Care Directive or POLST.  In general, how would you rate your overall physical health? Unable to answer due to dementia   Do you have a special diet?  Regular (no restrictions)        7/14/2025   Exercise, Social Connection, Stress   Days per week of moderate/strenous exercise Pt unable to answer due to dementia    Average minutes spent exercising at this level Pt Unable   Frequency of gathering with friends or relatives Pt Unable   Feel stress (tense, anxious, or unable to sleep) Pt Unable     Do you see a dentist two times every year?  Unable to go out for appts   Have you been more tired than usual lately?  Unable to answer due to dementia   If you drink alcohol do you typically have >3 drinks per day or >7 drinks per week? Does not drink alcohol   Do you have a current opioid prescription? No  Do you use any other controlled substances or medications that are not prescribed by a provider? None  Social History     Tobacco Use    Smoking status: Never    Smokeless tobacco: Never   Substance Use Topics    Alcohol use: Not Currently     Comment: 1-2 drinks per week     Drug use: Never       Needs assistance for the following daily activities: Lives on Memory Care and requires assistance with all ADLs  Which of the following safety concerns are present in your home?  none identified   Do you (or your family members) have any concerns about your safety while driving?  No, does not drive   Do you have any of the following hearing concerns?: No hearing concerns  In the past 6 months, have you been bothered by leaking of urine? No            7/14/2025   Fall Risk   Fallen 2 or more times in the past year? Yes   Trouble with walking or balance? Yes   Reason Gait Speed Test Not Completed Patient does not comprehend instructions   Reason for decline Dementia        ASCVD Risk   The ASCVD Risk score (Sai CARLISLE, et al., 2019) failed to calculate for the following reasons:    Cannot find a previous HDL lab    Cannot find a previous total cholesterol lab        Reviewed and updated as needed this visit by Provider   Tobacco  Allergies  Meds  Problems  Med Hx  Surg Hx  Fam Hx     Sexual Activity        Appropriate preventive services were discussed with this patient, including applicable screening as appropriate for fall prevention, nutrition, physical activity, Tobacco-use cessation, weight loss and cognition.  Checklist reviewing preventive services not given to patient due to dementia.             7/14/2025   Mini Cog   Mini-Cog Not Completed (choose reason) Known dementia             Today's PHQ-2 Score:       Unable to answer due to dementia                         Past Medical History:   Diagnosis Date    Alzheimer's disease (H)     Chronic a-fib (H)     Dupuytren contracture     Elevated prostate specific antigen (PSA)     History of colonic polyps     HLD (hyperlipidemia)     Prostate cancer (H)     radiation therapy 12/2019    Tear of lateral meniscus of left knee     Vitamin B12 deficiency (non anemic)     Vitamin D deficiency      Current providers sharing in care  "for this patient include:  Patient Care Team:  Juma Love APRN CNP as PCP - General (Nurse Practitioner - Gerontology)  Jarrod Small as Nursing Assistant  Peg Moeller MD as MD (Internal Medicine)  [place] ([facility]), [facility] APRN CNP as Assigned PCP    The following health maintenance items are reviewed in Epic and correct as of today:  Health Maintenance   Topic Date Due    LIPID  Never done    HEPATITIS C SCREENING  Never done    ZOSTER VACCINE (3 of 3) 09/09/2012    DTAP/TDAP/TD VACCINE (3 - Td or Tdap) 05/29/2024    COVID-19 VACCINE (7 - 2024-25 season) 09/01/2024    MEDICARE ANNUAL WELLNESS VISIT  11/03/2024    PHQ-2 (once per calendar year)  Never done    INFLUENZA VACCINE (1) 09/01/2025    FALL RISK ASSESSMENT  07/14/2026    DIABETES SCREENING  10/04/2027    ADVANCE CARE PLANNING  10/22/2029    PNEUMOCOCCAL VACCINE 50+ YEARS  Completed    RSV VACCINE  Completed    HPV VACCINE  Aged Out    MENINGITIS VACCINE  Aged Out    COLORECTAL CANCER SCREENING  Discontinued         Review of Systems  Unable to obtain due to dementia. Up ad deon without a device. Requires assist of 1 with cares. Staff reports no new issues.        Objective    Exam  /89   Pulse 110   Temp 97.8  F (36.6  C)   Resp 24   Wt 89.4 kg (197 lb 3.2 oz)   BMI 26.02 kg/m     Estimated body mass index is 26.02 kg/m  as calculated from the following:    Height as of 7/2/24: 1.854 m (6' 1\").    Weight as of this encounter: 89.4 kg (197 lb 3.2 oz).  GENERAL APPEARANCE:  Alert, in no distress   ENT:  [place]  EYES:  conjunctiva and lids normal  RESP:  lungs clear to auscultation   CV:  irregular rate and rhythm,  tachy, no murmur, no LE edema   ABDOMEN:  soft, non-tender, no distension  M/S:   gait steady, BEAN with good strength. No joint inflammation   SKIN:  no visible rashes or open areas   PSYCH:  oriented to self, insight and judgement impaired, memory impaired, affect normal          Signed " Electronically by: DASH Armstrong CNP

## (undated) DEVICE — SU VICRYL 5-0 S-14DA 18" J571G

## (undated) DEVICE — SU VICRYL 6-0 P-3 18" UND J492H

## (undated) DEVICE — SU PLAIN 6-0 G-1DA 18" 770G

## (undated) DEVICE — EYE PREP BETADINE 5% SOLUTION 30ML 0065-0411-30

## (undated) DEVICE — LINEN TOWEL PACK X5 5464

## (undated) DEVICE — SOL WATER IRRIG 1000ML BOTTLE 2F7114

## (undated) DEVICE — DRSG TELFA 3X8" 1238

## (undated) DEVICE — SU VICRYL 5-0 P-1 18" UND J490G

## (undated) DEVICE — ESU EYE HIGH TEMP 65410-183

## (undated) DEVICE — PACK OCULOPLATIC SEN15OCFSD

## (undated) RX ORDER — OXYCODONE HYDROCHLORIDE 5 MG/1
TABLET ORAL
Status: DISPENSED
Start: 2024-03-14

## (undated) RX ORDER — FENTANYL CITRATE 0.05 MG/ML
INJECTION, SOLUTION INTRAMUSCULAR; INTRAVENOUS
Status: DISPENSED
Start: 2024-03-14

## (undated) RX ORDER — FENTANYL CITRATE 50 UG/ML
INJECTION, SOLUTION INTRAMUSCULAR; INTRAVENOUS
Status: DISPENSED
Start: 2024-03-14

## (undated) RX ORDER — LIDOCAINE HYDROCHLORIDE AND EPINEPHRINE 10; 10 MG/ML; UG/ML
INJECTION, SOLUTION INFILTRATION; PERINEURAL
Status: DISPENSED
Start: 2024-03-14

## (undated) RX ORDER — ONDANSETRON 2 MG/ML
INJECTION INTRAMUSCULAR; INTRAVENOUS
Status: DISPENSED
Start: 2024-03-14

## (undated) RX ORDER — DEXAMETHASONE SODIUM PHOSPHATE 4 MG/ML
INJECTION, SOLUTION INTRA-ARTICULAR; INTRALESIONAL; INTRAMUSCULAR; INTRAVENOUS; SOFT TISSUE
Status: DISPENSED
Start: 2024-03-14